# Patient Record
Sex: MALE | Race: WHITE | NOT HISPANIC OR LATINO | Employment: OTHER | URBAN - METROPOLITAN AREA
[De-identification: names, ages, dates, MRNs, and addresses within clinical notes are randomized per-mention and may not be internally consistent; named-entity substitution may affect disease eponyms.]

---

## 2023-07-03 ENCOUNTER — TELEPHONE (OUTPATIENT)
Dept: TRANSPLANT | Facility: CLINIC | Age: 68
End: 2023-07-03

## 2023-07-03 DIAGNOSIS — I50.9 CONGESTIVE HEART FAILURE, UNSPECIFIED HF CHRONICITY, UNSPECIFIED HEART FAILURE TYPE: Primary | ICD-10-CM

## 2023-08-17 ENCOUNTER — TELEPHONE (OUTPATIENT)
Dept: INTERNAL MEDICINE | Facility: CLINIC | Age: 68
End: 2023-08-17
Payer: COMMERCIAL

## 2023-08-17 NOTE — TELEPHONE ENCOUNTER
----- Message from Sanju Bowser sent at 8/17/2023  2:15 PM CDT -----  Name of Who is Calling: RIO DIALLO [[id number]]           What is the request in detail: Patient is requesting a call back to schedule a new patient appointment to establish care.  Please assist.           Can the clinic reply by [facility]: No           What Number to Call Back if not in [facility]: [phone]

## 2023-08-29 ENCOUNTER — OFFICE VISIT (OUTPATIENT)
Dept: CARDIOLOGY | Facility: CLINIC | Age: 68
End: 2023-08-29
Payer: MEDICARE

## 2023-08-29 VITALS
OXYGEN SATURATION: 98 % | HEART RATE: 77 BPM | DIASTOLIC BLOOD PRESSURE: 96 MMHG | BODY MASS INDEX: 31.5 KG/M2 | WEIGHT: 196 LBS | SYSTOLIC BLOOD PRESSURE: 154 MMHG | HEIGHT: 66 IN

## 2023-08-29 DIAGNOSIS — Z79.4 DIABETES MELLITUS DUE TO UNDERLYING CONDITION WITH HYPEROSMOLARITY WITHOUT COMA, WITH LONG-TERM CURRENT USE OF INSULIN: ICD-10-CM

## 2023-08-29 DIAGNOSIS — Z95.810 ICD (IMPLANTABLE CARDIOVERTER-DEFIBRILLATOR) IN PLACE: ICD-10-CM

## 2023-08-29 DIAGNOSIS — I42.8 NONISCHEMIC CARDIOMYOPATHY: Primary | ICD-10-CM

## 2023-08-29 DIAGNOSIS — I10 PRIMARY HYPERTENSION: ICD-10-CM

## 2023-08-29 DIAGNOSIS — E08.00 DIABETES MELLITUS DUE TO UNDERLYING CONDITION WITH HYPEROSMOLARITY WITHOUT COMA, WITH LONG-TERM CURRENT USE OF INSULIN: ICD-10-CM

## 2023-08-29 DIAGNOSIS — Z95.810 AICD (AUTOMATIC CARDIOVERTER/DEFIBRILLATOR) PRESENT: ICD-10-CM

## 2023-08-29 DIAGNOSIS — I49.5 SSS (SICK SINUS SYNDROME): ICD-10-CM

## 2023-08-29 DIAGNOSIS — N05.9 GLOMERULONEPHRITIS: ICD-10-CM

## 2023-08-29 PROCEDURE — 99213 OFFICE O/P EST LOW 20 MIN: CPT | Mod: PBBFAC | Performed by: INTERNAL MEDICINE

## 2023-08-29 PROCEDURE — 99999 PR PBB SHADOW E&M-EST. PATIENT-LVL III: CPT | Mod: PBBFAC,,, | Performed by: INTERNAL MEDICINE

## 2023-08-29 PROCEDURE — 99204 OFFICE O/P NEW MOD 45 MIN: CPT | Mod: S$PBB,,, | Performed by: INTERNAL MEDICINE

## 2023-08-29 PROCEDURE — 99999 PR PBB SHADOW E&M-EST. PATIENT-LVL III: ICD-10-PCS | Mod: PBBFAC,,, | Performed by: INTERNAL MEDICINE

## 2023-08-29 PROCEDURE — 99204 PR OFFICE/OUTPT VISIT, NEW, LEVL IV, 45-59 MIN: ICD-10-PCS | Mod: S$PBB,,, | Performed by: INTERNAL MEDICINE

## 2023-08-29 NOTE — PROGRESS NOTES
"Subjective:    Patient ID:  Ramesh Acosta is a 68 y.o. male who presents for management of non ischemic cardiomyopathy.    HPI  The patient is a 68 year old male presents to establish care at [facility]. He is a [place] native. He has been receiving his health care at the [facility] under the care of Dr Rob[ Advanced Heart failure-Transplant]. He has non ischemic cardiomyopathy with an initial EF of 20% 10/26/20 that has improved to 40% 6/14/23 with medical management. There is a history of alcohol use. He has diabetes, hypertension and hyperlipidemia. He has sick sinus syndrome and had a pacemaker placed but later replaced by a ICD. He has a form of glomerulonephritis and now has normal renal function. He has OPAL but not on CPAP. Currently he is asymptomatic,  is retired and active.      No results found for: "NA", "K", "CL", "CO2", "BUN", "CREATININE", "GLU", "HGBA1C", "MG", "AST", "ALT", "ALBUMIN", "PROT", "BILITOT", "WBC", "HGB", "HCT", "MCV", "PLT", "INR", "PSA", "TSH"      No results found for: "CHOL", "HDL", "LDL", "TRIG"    No results found for: "LDLCALC"    History reviewed. No pertinent past medical history.  No current outpatient medications on file.          Review of Systems   Constitutional: Negative for decreased appetite, diaphoresis, fever, malaise/fatigue, weight gain and weight loss.   HENT:  Negative for congestion, ear discharge, ear pain and nosebleeds.    Eyes:  Negative for blurred vision, double vision and visual disturbance.   Cardiovascular:  Negative for chest pain, claudication, cyanosis, dyspnea on exertion, irregular heartbeat, leg swelling, near-syncope, orthopnea, palpitations, paroxysmal nocturnal dyspnea and syncope.   Respiratory:  Positive for snoring. Negative for cough, hemoptysis, shortness of breath, sleep disturbances due to breathing, sputum production and wheezing.    Endocrine: Negative for polydipsia, polyphagia and polyuria. " "  Hematologic/Lymphatic: Negative for adenopathy and bleeding problem. Does not bruise/bleed easily.   Skin:  Negative for color change, nail changes, poor wound healing and rash.   Musculoskeletal:  Negative for muscle cramps and muscle weakness.   Gastrointestinal:  Negative for abdominal pain, anorexia, change in bowel habit, hematochezia, nausea and vomiting.   Genitourinary:  Negative for dysuria, frequency and hematuria.   Neurological:  Negative for brief paralysis, difficulty with concentration, excessive daytime sleepiness, dizziness, focal weakness, headaches, light-headedness, seizures, vertigo and weakness.   Psychiatric/Behavioral:  Negative for altered mental status and depression.    Allergic/Immunologic: Negative for persistent infections.        Objective:BP (!) 154/96   Pulse 77   Ht 5' 6" (1.676 m)   Wt 88.9 kg (195 lb 15.8 oz)   SpO2 98%   BMI 31.63 kg/m²             Physical Exam  Constitutional:       Appearance: He is well-developed. He is obese.   HENT:      Head: Normocephalic.      Right Ear: External ear normal.      Left Ear: External ear normal.      Nose: Nose normal.   Eyes:      General: No scleral icterus.     Pupils: Pupils are equal, round, and reactive to light.   Neck:      Thyroid: No thyromegaly.      Vascular: No JVD.      Trachea: No tracheal deviation.   Cardiovascular:      Rate and Rhythm: Normal rate and regular rhythm.      Pulses: Intact distal pulses.           Carotid pulses are 2+ on the right side and 2+ on the left side.       Dorsalis pedis pulses are 2+ on the right side and 2+ on the left side.        Posterior tibial pulses are 2+ on the right side and 2+ on the left side.      Heart sounds: No murmur heard.     No friction rub. No gallop.   Pulmonary:      Effort: Pulmonary effort is normal.      Breath sounds: Normal breath sounds.   Abdominal:      General: Bowel sounds are normal. There is no distension.      Tenderness: There is no abdominal " tenderness. There is no guarding.   Musculoskeletal:         General: No tenderness. Normal range of motion.      Cervical back: Normal range of motion and neck supple.   Lymphadenopathy:      Comments: Palpation of neck and groin lymph nodes normal   Skin:     General: Skin is dry.      Comments: Palpation of skin normal   Neurological:      Mental Status: He is alert and oriented to person, place, and time.      Cranial Nerves: No cranial nerve deficit.      Motor: No abnormal muscle tone.      Coordination: Coordination normal.   Psychiatric:         Behavior: Behavior normal.         Thought Content: Thought content normal.         Judgment: Judgment normal.         Assessment:       1. Nonischemic cardiomyopathy    2. SSS (sick sinus syndrome)    3. AICD (automatic cardioverter/defibrillator) present    4. Primary hypertension    5. Glomerulonephritis    6. Diabetes mellitus due to underlying condition with hyperosmolarity without coma, with long-term current use of insulin    7. ICD (implantable cardioverter-defibrillator) in place         Plan:       Ramesh was seen today for family h/x cad.    Diagnoses and all orders for this visit:    Nonischemic cardiomyopathy  -     CBC Auto Differential; Future; Expected date: 08/29/2023  -     Comprehensive Metabolic Panel; Future; Expected date: 08/29/2023  -     SCHEDULED EKG 12-LEAD (to Muse); Future  -     Echo Saline Bubble? No; Future    SSS (sick sinus syndrome)    AICD (automatic cardioverter/defibrillator) present    Primary hypertension    Glomerulonephritis    Diabetes mellitus due to underlying condition with hyperosmolarity without coma, with long-term current use of insulin  -     Lipid Panel; Future; Expected date: 08/29/2023  -     Hemoglobin A1C; Future; Expected date: 11/27/2023    ICD (implantable cardioverter-defibrillator) in place  -     Ambulatory referral/consult to Electrophysiology; Future; Expected date: 09/05/2023

## 2023-08-30 ENCOUNTER — PATIENT MESSAGE (OUTPATIENT)
Dept: CARDIOLOGY | Facility: CLINIC | Age: 68
End: 2023-08-30
Payer: COMMERCIAL

## 2023-09-06 DIAGNOSIS — I48.0 PAROXYSMAL ATRIAL FIBRILLATION: Primary | ICD-10-CM

## 2023-09-12 ENCOUNTER — PATIENT OUTREACH (OUTPATIENT)
Dept: ADMINISTRATIVE | Facility: HOSPITAL | Age: 68
End: 2023-09-12
Payer: COMMERCIAL

## 2023-09-12 ENCOUNTER — OFFICE VISIT (OUTPATIENT)
Dept: ELECTROPHYSIOLOGY | Facility: CLINIC | Age: 68
End: 2023-09-12
Payer: MEDICARE

## 2023-09-12 ENCOUNTER — CLINICAL SUPPORT (OUTPATIENT)
Dept: CARDIOLOGY | Facility: HOSPITAL | Age: 68
End: 2023-09-12
Attending: INTERNAL MEDICINE
Payer: MEDICARE

## 2023-09-12 VITALS
BODY MASS INDEX: 31.96 KG/M2 | DIASTOLIC BLOOD PRESSURE: 76 MMHG | SYSTOLIC BLOOD PRESSURE: 124 MMHG | WEIGHT: 198.88 LBS | HEART RATE: 81 BPM | HEIGHT: 66 IN

## 2023-09-12 DIAGNOSIS — E11.9 TYPE 2 DIABETES MELLITUS WITHOUT COMPLICATION, WITH LONG-TERM CURRENT USE OF INSULIN: ICD-10-CM

## 2023-09-12 DIAGNOSIS — I48.0 PAROXYSMAL ATRIAL FIBRILLATION: ICD-10-CM

## 2023-09-12 DIAGNOSIS — I10 PRIMARY HYPERTENSION: ICD-10-CM

## 2023-09-12 DIAGNOSIS — Z79.4 TYPE 2 DIABETES MELLITUS WITHOUT COMPLICATION, WITH LONG-TERM CURRENT USE OF INSULIN: ICD-10-CM

## 2023-09-12 DIAGNOSIS — Z95.810 ICD (IMPLANTABLE CARDIOVERTER-DEFIBRILLATOR) IN PLACE: ICD-10-CM

## 2023-09-12 DIAGNOSIS — I42.8 NONISCHEMIC CARDIOMYOPATHY: ICD-10-CM

## 2023-09-12 PROBLEM — N18.31 TYPE 2 DIABETES MELLITUS WITH STAGE 3A CHRONIC KIDNEY DISEASE, WITH LONG-TERM CURRENT USE OF INSULIN: Status: ACTIVE | Noted: 2023-09-12

## 2023-09-12 PROBLEM — E11.22 TYPE 2 DIABETES MELLITUS WITH STAGE 3A CHRONIC KIDNEY DISEASE, WITH LONG-TERM CURRENT USE OF INSULIN: Status: ACTIVE | Noted: 2023-09-12

## 2023-09-12 PROCEDURE — 93010 ELECTROCARDIOGRAM REPORT: CPT | Mod: S$PBB,,, | Performed by: INTERNAL MEDICINE

## 2023-09-12 PROCEDURE — 93010 EKG 12-LEAD: ICD-10-PCS | Mod: S$PBB,,, | Performed by: INTERNAL MEDICINE

## 2023-09-12 PROCEDURE — 99213 OFFICE O/P EST LOW 20 MIN: CPT | Mod: PBBFAC | Performed by: INTERNAL MEDICINE

## 2023-09-12 PROCEDURE — 99204 OFFICE O/P NEW MOD 45 MIN: CPT | Mod: S$PBB,,, | Performed by: INTERNAL MEDICINE

## 2023-09-12 PROCEDURE — 93283 PRGRMG EVAL IMPLANTABLE DFB: CPT | Mod: 26,,, | Performed by: INTERNAL MEDICINE

## 2023-09-12 PROCEDURE — 99999 PR PBB SHADOW E&M-EST. PATIENT-LVL III: ICD-10-PCS | Mod: PBBFAC,,, | Performed by: INTERNAL MEDICINE

## 2023-09-12 PROCEDURE — 99999 PR PBB SHADOW E&M-EST. PATIENT-LVL III: CPT | Mod: PBBFAC,,, | Performed by: INTERNAL MEDICINE

## 2023-09-12 PROCEDURE — 99204 PR OFFICE/OUTPT VISIT, NEW, LEVL IV, 45-59 MIN: ICD-10-PCS | Mod: S$PBB,,, | Performed by: INTERNAL MEDICINE

## 2023-09-12 PROCEDURE — 93283 CARDIAC DEVICE CHECK - IN CLINIC & HOSPITAL: ICD-10-PCS | Mod: 26,,, | Performed by: INTERNAL MEDICINE

## 2023-09-12 PROCEDURE — 93005 ELECTROCARDIOGRAM TRACING: CPT | Mod: PBBFAC | Performed by: INTERNAL MEDICINE

## 2023-09-12 PROCEDURE — 93283 PRGRMG EVAL IMPLANTABLE DFB: CPT

## 2023-09-12 RX ORDER — FUROSEMIDE 20 MG/1
20 TABLET ORAL DAILY
COMMUNITY
End: 2023-10-12 | Stop reason: SDUPTHER

## 2023-09-12 RX ORDER — FLUTICASONE PROPIONATE 50 MCG
1 SPRAY, SUSPENSION (ML) NASAL DAILY
COMMUNITY

## 2023-09-12 RX ORDER — LEVOCETIRIZINE DIHYDROCHLORIDE 5 MG/1
5 TABLET, FILM COATED ORAL NIGHTLY
COMMUNITY

## 2023-09-12 RX ORDER — ROSUVASTATIN CALCIUM 5 MG/1
5 TABLET, COATED ORAL DAILY
COMMUNITY
End: 2023-10-12 | Stop reason: SDUPTHER

## 2023-09-12 RX ORDER — HYDROXYZINE HYDROCHLORIDE 25 MG/1
25 TABLET, FILM COATED ORAL NIGHTLY
COMMUNITY
End: 2023-10-12 | Stop reason: SDUPTHER

## 2023-09-12 RX ORDER — CARVEDILOL 12.5 MG/1
25 TABLET ORAL 2 TIMES DAILY WITH MEALS
COMMUNITY
End: 2023-10-10 | Stop reason: SDUPTHER

## 2023-09-12 RX ORDER — INSULIN GLARGINE 100 [IU]/ML
INJECTION, SOLUTION SUBCUTANEOUS NIGHTLY
COMMUNITY
End: 2023-09-26 | Stop reason: SDUPTHER

## 2023-09-12 RX ORDER — SPIRONOLACTONE 25 MG/1
12.5 TABLET ORAL DAILY
COMMUNITY
End: 2023-11-14 | Stop reason: SDUPTHER

## 2023-09-12 NOTE — PROGRESS NOTES
Subjective:   Patient ID:  Ramesh Acosta is a 68 y.o. male who presents for evaluation of ICD check    Referring Cardiologist: Ajay Azul MD    HPI  I had the pleasure of seeing Mr. Acosta today in our electrophysiology clinic in consultation for his ICD. As you are aware he is a pleasant 68 year-old man with sick sinus syndrome s/p PPM, nonischemic CMP with PPM upgraded to ICD in 2021, hypertension, and diabetes mellitus. His PPM was originally implanted in 1984 for syncope. He reports he had prior lead revisions due to a lead fracture and then had an episode of a lead erosion after generator change resulting in system extraction. He was diagnosed with heart failure in 10/2020 with a LVEF of 20%. His device was upgraded to an ICD (right sided). Recent ECHO in June of 2023 noted a LVEF of 40%. He recently moved back to [place] and is here to establish care for his ICD. He feels well and is without complaints.    In-clinic ICD check notes stable lead parameters with 3.6% RA and no RV pacing. No arrhythmias. Estimated battery longevity is 10 years.    My interpretation of today's in-clinic ECG is normal sinus rhythm with a narrow QRS and normal intervals.    Review of Systems   Constitutional: Negative for fever and malaise/fatigue.   HENT:  Negative for congestion and sore throat.    Eyes:  Negative for blurred vision and visual disturbance.   Cardiovascular:  Negative for chest pain, dyspnea on exertion, irregular heartbeat, near-syncope, palpitations and syncope.   Respiratory:  Negative for cough and shortness of breath.    Hematologic/Lymphatic: Negative for bleeding problem. Does not bruise/bleed easily.   Skin: Negative.    Musculoskeletal: Negative.    Gastrointestinal:  Negative for bloating, abdominal pain, hematochezia and melena.   Neurological:  Negative for focal weakness and weakness.   Psychiatric/Behavioral: Negative.         Objective:   Physical Exam  Vitals reviewed.   Constitutional:        General: He is not in acute distress.     Appearance: He is well-developed. He is not diaphoretic.   HENT:      Head: Normocephalic and atraumatic.   Eyes:      General:         Right eye: No discharge.         Left eye: No discharge.      Conjunctiva/sclera: Conjunctivae normal.   Cardiovascular:      Rate and Rhythm: Normal rate and regular rhythm.      Heart sounds: No murmur heard.     No friction rub. No gallop.   Pulmonary:      Effort: Pulmonary effort is normal. No respiratory distress.      Breath sounds: Normal breath sounds. No wheezing or rales.   Abdominal:      General: Bowel sounds are normal. There is no distension.      Palpations: Abdomen is soft.      Tenderness: There is no abdominal tenderness.   Musculoskeletal:      Cervical back: Neck supple.   Skin:     General: Skin is warm and dry.   Neurological:      Mental Status: He is alert and oriented to person, place, and time.   Psychiatric:         Behavior: Behavior normal.         Thought Content: Thought content normal.         Judgment: Judgment normal.         Assessment:      1. ICD (implantable cardioverter-defibrillator) in place    2. Nonischemic cardiomyopathy    3. Primary hypertension    4. Type 2 diabetes mellitus without complication, with long-term current use of insulin        Plan:   In summary, Mr. Acosta is a pleasant 68 year-old man with sick sinus syndrome s/p PPM, nonischemic CMP with PPM upgraded to ICD, hypertension, and diabetes mellitus. Previous PPM was left sided but he had a pocket erosion followed by extraction. ICD is right sided and operating normally. Will enroll in remote monitoring clinic. RTC in 1 year.    Thank you for allowing me to participate in the care of this patient. Please do not hesitate to call me with any questions or concerns.    Bright Smith MD, PhD  Cardiac Electrophysiology

## 2023-09-19 ENCOUNTER — LAB VISIT (OUTPATIENT)
Dept: LAB | Facility: HOSPITAL | Age: 68
End: 2023-09-19
Attending: INTERNAL MEDICINE
Payer: MEDICARE

## 2023-09-19 DIAGNOSIS — Z79.4 DIABETES MELLITUS DUE TO UNDERLYING CONDITION WITH HYPEROSMOLARITY WITHOUT COMA, WITH LONG-TERM CURRENT USE OF INSULIN: ICD-10-CM

## 2023-09-19 DIAGNOSIS — E08.00 DIABETES MELLITUS DUE TO UNDERLYING CONDITION WITH HYPEROSMOLARITY WITHOUT COMA, WITH LONG-TERM CURRENT USE OF INSULIN: ICD-10-CM

## 2023-09-19 DIAGNOSIS — I42.8 NONISCHEMIC CARDIOMYOPATHY: ICD-10-CM

## 2023-09-19 LAB
ALBUMIN SERPL BCP-MCNC: 3.5 G/DL (ref 3.5–5.2)
ALP SERPL-CCNC: 65 U/L (ref 55–135)
ALT SERPL W/O P-5'-P-CCNC: 15 U/L (ref 10–44)
ANION GAP SERPL CALC-SCNC: 7 MMOL/L (ref 8–16)
AST SERPL-CCNC: 14 U/L (ref 10–40)
BASOPHILS # BLD AUTO: 0.03 K/UL (ref 0–0.2)
BASOPHILS NFR BLD: 0.5 % (ref 0–1.9)
BILIRUB SERPL-MCNC: 1.5 MG/DL (ref 0.1–1)
BUN SERPL-MCNC: 17 MG/DL (ref 8–23)
CALCIUM SERPL-MCNC: 8.7 MG/DL (ref 8.7–10.5)
CHLORIDE SERPL-SCNC: 108 MMOL/L (ref 95–110)
CHOLEST SERPL-MCNC: 126 MG/DL (ref 120–199)
CHOLEST/HDLC SERPL: 2.5 {RATIO} (ref 2–5)
CO2 SERPL-SCNC: 29 MMOL/L (ref 23–29)
CREAT SERPL-MCNC: 1.1 MG/DL (ref 0.5–1.4)
DIFFERENTIAL METHOD: NORMAL
EOSINOPHIL # BLD AUTO: 0.1 K/UL (ref 0–0.5)
EOSINOPHIL NFR BLD: 0.9 % (ref 0–8)
ERYTHROCYTE [DISTWIDTH] IN BLOOD BY AUTOMATED COUNT: 14.3 % (ref 11.5–14.5)
EST. GFR  (NO RACE VARIABLE): >60 ML/MIN/1.73 M^2
ESTIMATED AVG GLUCOSE: 131 MG/DL (ref 68–131)
GLUCOSE SERPL-MCNC: 101 MG/DL (ref 70–110)
HBA1C MFR BLD: 6.2 % (ref 4–5.6)
HCT VFR BLD AUTO: 43.7 % (ref 40–54)
HDLC SERPL-MCNC: 50 MG/DL (ref 40–75)
HDLC SERPL: 39.7 % (ref 20–50)
HGB BLD-MCNC: 14.8 G/DL (ref 14–18)
IMM GRANULOCYTES # BLD AUTO: 0.01 K/UL (ref 0–0.04)
IMM GRANULOCYTES NFR BLD AUTO: 0.2 % (ref 0–0.5)
LDLC SERPL CALC-MCNC: 62 MG/DL (ref 63–159)
LYMPHOCYTES # BLD AUTO: 1.8 K/UL (ref 1–4.8)
LYMPHOCYTES NFR BLD: 32.7 % (ref 18–48)
MCH RBC QN AUTO: 30.6 PG (ref 27–31)
MCHC RBC AUTO-ENTMCNC: 33.9 G/DL (ref 32–36)
MCV RBC AUTO: 90 FL (ref 82–98)
MONOCYTES # BLD AUTO: 0.5 K/UL (ref 0.3–1)
MONOCYTES NFR BLD: 8.3 % (ref 4–15)
NEUTROPHILS # BLD AUTO: 3.2 K/UL (ref 1.8–7.7)
NEUTROPHILS NFR BLD: 57.4 % (ref 38–73)
NONHDLC SERPL-MCNC: 76 MG/DL
NRBC BLD-RTO: 0 /100 WBC
PLATELET # BLD AUTO: 167 K/UL (ref 150–450)
PMV BLD AUTO: 10.2 FL (ref 9.2–12.9)
POTASSIUM SERPL-SCNC: 3.5 MMOL/L (ref 3.5–5.1)
PROT SERPL-MCNC: 6.4 G/DL (ref 6–8.4)
RBC # BLD AUTO: 4.84 M/UL (ref 4.6–6.2)
SODIUM SERPL-SCNC: 144 MMOL/L (ref 136–145)
TRIGL SERPL-MCNC: 70 MG/DL (ref 30–150)
WBC # BLD AUTO: 5.54 K/UL (ref 3.9–12.7)

## 2023-09-19 PROCEDURE — 80053 COMPREHEN METABOLIC PANEL: CPT | Performed by: INTERNAL MEDICINE

## 2023-09-19 PROCEDURE — 85025 COMPLETE CBC W/AUTO DIFF WBC: CPT | Performed by: INTERNAL MEDICINE

## 2023-09-19 PROCEDURE — 80061 LIPID PANEL: CPT | Performed by: INTERNAL MEDICINE

## 2023-09-19 PROCEDURE — 36415 COLL VENOUS BLD VENIPUNCTURE: CPT | Mod: PN | Performed by: INTERNAL MEDICINE

## 2023-09-19 PROCEDURE — 83036 HEMOGLOBIN GLYCOSYLATED A1C: CPT | Performed by: INTERNAL MEDICINE

## 2023-09-26 ENCOUNTER — LAB VISIT (OUTPATIENT)
Dept: LAB | Facility: OTHER | Age: 68
End: 2023-09-26
Attending: FAMILY MEDICINE
Payer: MEDICARE

## 2023-09-26 ENCOUNTER — OFFICE VISIT (OUTPATIENT)
Dept: INTERNAL MEDICINE | Facility: CLINIC | Age: 68
End: 2023-09-26
Payer: MEDICARE

## 2023-09-26 VITALS
HEIGHT: 66 IN | SYSTOLIC BLOOD PRESSURE: 142 MMHG | WEIGHT: 206.38 LBS | OXYGEN SATURATION: 96 % | DIASTOLIC BLOOD PRESSURE: 88 MMHG | BODY MASS INDEX: 33.17 KG/M2 | HEART RATE: 74 BPM

## 2023-09-26 DIAGNOSIS — Z12.5 PROSTATE CANCER SCREENING: ICD-10-CM

## 2023-09-26 DIAGNOSIS — I10 PRIMARY HYPERTENSION: ICD-10-CM

## 2023-09-26 DIAGNOSIS — Z79.4 TYPE 2 DIABETES MELLITUS WITHOUT COMPLICATION, WITH LONG-TERM CURRENT USE OF INSULIN: ICD-10-CM

## 2023-09-26 DIAGNOSIS — R97.20 ELEVATED PSA, LESS THAN 10 NG/ML: ICD-10-CM

## 2023-09-26 DIAGNOSIS — Z95.810 ICD (IMPLANTABLE CARDIOVERTER-DEFIBRILLATOR) IN PLACE: ICD-10-CM

## 2023-09-26 DIAGNOSIS — E11.9 TYPE 2 DIABETES MELLITUS WITHOUT COMPLICATION, WITH LONG-TERM CURRENT USE OF INSULIN: ICD-10-CM

## 2023-09-26 DIAGNOSIS — R97.20 ELEVATED PSA, LESS THAN 10 NG/ML: Primary | ICD-10-CM

## 2023-09-26 DIAGNOSIS — Z87.448 HISTORY OF GLOMERULONEPHRITIS: ICD-10-CM

## 2023-09-26 DIAGNOSIS — R97.20 PSA ELEVATION: ICD-10-CM

## 2023-09-26 DIAGNOSIS — I42.8 NONISCHEMIC CARDIOMYOPATHY: ICD-10-CM

## 2023-09-26 PROCEDURE — 99999 PR PBB SHADOW E&M-EST. PATIENT-LVL III: CPT | Mod: PBBFAC,,, | Performed by: FAMILY MEDICINE

## 2023-09-26 PROCEDURE — 36415 COLL VENOUS BLD VENIPUNCTURE: CPT | Performed by: FAMILY MEDICINE

## 2023-09-26 PROCEDURE — 84153 ASSAY OF PSA TOTAL: CPT | Performed by: FAMILY MEDICINE

## 2023-09-26 PROCEDURE — 99999 PR PBB SHADOW E&M-EST. PATIENT-LVL III: ICD-10-PCS | Mod: PBBFAC,,, | Performed by: FAMILY MEDICINE

## 2023-09-26 PROCEDURE — 99204 OFFICE O/P NEW MOD 45 MIN: CPT | Mod: S$PBB,,, | Performed by: FAMILY MEDICINE

## 2023-09-26 PROCEDURE — 99204 PR OFFICE/OUTPT VISIT, NEW, LEVL IV, 45-59 MIN: ICD-10-PCS | Mod: S$PBB,,, | Performed by: FAMILY MEDICINE

## 2023-09-26 PROCEDURE — 99213 OFFICE O/P EST LOW 20 MIN: CPT | Mod: PBBFAC | Performed by: FAMILY MEDICINE

## 2023-09-26 RX ORDER — INSULIN GLARGINE 100 [IU]/ML
10 INJECTION, SOLUTION SUBCUTANEOUS NIGHTLY
Qty: 10 ML | Refills: 3 | Status: SHIPPED | OUTPATIENT
Start: 2023-09-26 | End: 2023-10-12 | Stop reason: SDUPTHER

## 2023-09-26 NOTE — PROGRESS NOTES
Subjective:      Chief Complaint: No chief complaint on file.    HPI  Ramesh Acosta is a 68 y.o. male. He presents to establish care at [facility]. He is a [place] native, but has been receiving health care at the [facility]. He has a PMHx of diabetes, hypertension, hyperlipidemia, non ischemic cardiomyopathy, sick sinus syndrome, OPAL but not on CPAP, glomerulonephritis with normal renal function.  Currently he is asymptomatic,  is retired and active.    Cardiac hx  History of non ischemic cardiomyopathy (suspected from prior alcohol intake) with an initial EF of 20% 10/26/20 that has improved to 40% 6/14/23 with medical management. Currently managed with Entresto, carvedilol, furosemide, spironolactone. Follows with Dr. Azul ([facility]). Next visit with cardiology in 6 months.     H/o ICD  Sick sinus syndrome  History of sick sinus syndrome and had a pacemaker placed 30 years ago, but later replaced by a ICD. Had follow up with Dr. Smith on 9/12/23. In-clinic ICD check notes stable lead parameters with 3.6% RA and no RV pacing. No arrhythmias. Estimated battery longevity is 10 years. He will follow up yearly with electrophysiology.     HTN  Checks BP at home semi-regularly. Used to keep a log but has not been as diligent lately. BP in clinic today is 142/88.  When he checks at home it is usually 130's/80's. Occasionally forgets to take carvedilol due to needing to take it with food.     Diabetes  Last A1c was 6.2 on 9/19/23. Currently taking insulin for sugar control. On 10 units of long acting at night, does not take any short acting insulin. At home, sugars averaging 100 in the morning before eating. Tried metformin in the past but made him nauseated and gave him diarrhea.     Glomerulonephritis  Diagnosed with minimal change disease in 2019. Treated with steroids for 4 months at that time. In full remission and has normal renal function now. Last Cr was 1.1 on 9/12/23. Will follow  with nephrologist at [facility].     Hyperlipidemia  Last lipid panel was WNL on 9/19/23. Taking rosuvastatin daily with no concerns or complaints.     Gout  Had an episode of gout a year and a half ago and noticed beer triggered him. Not drinking beer anymore. Had colchicine at that time. Usually gets episodes in his toes and feet.     Current medications: up to date and no concerns or complaints. Does not need refills at this time.     Health maintenance   Colorectal screening - ColoGuard on 1/27/23 (q 3 years)  PSA - 4.62 on 9/2020  Annual labs   Vaccines  - due for annual flu  - due for COVID booster  - received first dose of Shingrix at [facility] earlier this year  - RSV - eligible  - pneumococcal 23 - 2020, due for 13    ROS  Review of Systems   Constitutional:  Negative for appetite change, chills, fatigue, fever and unexpected weight change.   HENT:  Negative for congestion, postnasal drip, rhinorrhea and sore throat.    Respiratory:  Negative for cough and shortness of breath.    Cardiovascular:  Positive for leg swelling. Negative for chest pain and palpitations.   Gastrointestinal:  Negative for abdominal pain, constipation, diarrhea, nausea and vomiting.   Genitourinary:  Negative for difficulty urinating, dysuria, frequency, hematuria and urgency.   Musculoskeletal:  Negative for arthralgias, back pain and neck pain.   Allergic/Immunologic: Positive for environmental allergies.   Neurological:  Negative for dizziness, syncope, weakness, light-headedness, numbness and headaches.   Psychiatric/Behavioral:  Negative for confusion, dysphoric mood and sleep disturbance. The patient is not nervous/anxious.           Objective:      Physical Exam   Physical Exam  Vitals and nursing note reviewed.   Constitutional:       Appearance: Normal appearance. He is normal weight.   HENT:      Head: Normocephalic and atraumatic.      Nose: Nose normal.      Mouth/Throat:      Mouth: Mucous membranes are moist.      Pharynx:  Oropharynx is clear.   Eyes:      Extraocular Movements: Extraocular movements intact.      Conjunctiva/sclera: Conjunctivae normal.   Cardiovascular:      Rate and Rhythm: Normal rate and regular rhythm.      Pulses: Normal pulses.      Heart sounds: Normal heart sounds.   Pulmonary:      Effort: Pulmonary effort is normal.      Breath sounds: Normal breath sounds.   Abdominal:      General: Abdomen is flat. Bowel sounds are normal.      Palpations: Abdomen is soft.   Musculoskeletal:         General: No swelling. Normal range of motion.      Cervical back: Normal range of motion and neck supple.      Right lower le+ Pitting Edema present.      Left lower le+ Pitting Edema present.   Skin:     General: Skin is warm and dry.      Capillary Refill: Capillary refill takes less than 2 seconds.   Neurological:      General: No focal deficit present.      Mental Status: He is alert and oriented to person, place, and time.   Psychiatric:         Mood and Affect: Mood normal.         Behavior: Behavior normal.         Thought Content: Thought content normal.          Assessment:       1. Elevated PSA, less than 10 ng/ml  PSA, SCREENING      2. Type 2 diabetes mellitus without complication, with long-term current use of insulin  insulin glargine (LANTUS) 100 unit/mL injection    RSV vac, preF A and preF B,PF, 120 mcg/0.5 mL SolR      3. Nonischemic cardiomyopathy        4. ICD (implantable cardioverter-defibrillator) in place        5. Primary hypertension        6. History of glomerulonephritis  Ambulatory referral/consult to Nephrology      7. PSA elevation        8. Prostate cancer screening  PSA, SCREENING            Plan:       Elevated PSA, less than 10 ng/ml  Prostate cancer screening  Elevated to 4.62 in 2020. Will re-check today and discuss results with patient via My Chart.   - PSA, SCREENING; Future    Type 2 diabetes mellitus without complication, with long-term current use of insulin  Chronic, stable.  Last HbA1c elevated to 6.2. Counseled on dietary changes. Patient will continue checking daily sugars. Refilled insulin for one year.   - insulin glargine (LANTUS) 100 unit/mL injection; Inject 10 Units into the skin every evening.  Dispense: 10 mL; Refill: 3  - RSV vac, preF A and preF B,PF, 120 mcg/0.5 mL SolR; Inject 0.5 mLs (120 mcg total) into the muscle once. for 1 dose  Dispense: 0.5 mL; Refill: 0    Nonischemic cardiomyopathy  Chronic, stable. Managed by Dr. Azul and will follow up in 6 months for routine care.     ICD (implantable cardioverter-defibrillator) in place  Follows annually with electrophysiology and had appointment earlier this month. No complaints.     Primary hypertension  Slightly elevated in clinic today. Patient will start checking BP at home with cuff and keep daily log.     History of glomerulonephritis  In remission. Referral placed to nephrology so he can follow up with them to establish care.   - Ambulatory referral/consult to Nephrology; Future       Health Maintenance  - updated annual flu today  - PSA ordered   - information about RSV and COVID booster vaccines given  - refilled insulin   - referral to nephrology placed  - RTC for routine annual follow-up or sooner as needed

## 2023-09-27 LAB — COMPLEXED PSA SERPL-MCNC: 1.8 NG/ML (ref 0–4)

## 2023-09-29 ENCOUNTER — HOSPITAL ENCOUNTER (EMERGENCY)
Facility: HOSPITAL | Age: 68
Discharge: HOME | End: 2023-09-29
Attending: EMERGENCY MEDICINE
Payer: MEDICARE

## 2023-09-29 VITALS
DIASTOLIC BLOOD PRESSURE: 80 MMHG | HEART RATE: 69 BPM | BODY MASS INDEX: 33.17 KG/M2 | SYSTOLIC BLOOD PRESSURE: 116 MMHG | OXYGEN SATURATION: 97 % | RESPIRATION RATE: 18 BRPM | HEIGHT: 66 IN | WEIGHT: 206.38 LBS | TEMPERATURE: 98 F

## 2023-09-29 DIAGNOSIS — E87.6 HYPOKALEMIA: ICD-10-CM

## 2023-09-29 DIAGNOSIS — R55 SYNCOPE: Primary | ICD-10-CM

## 2023-09-29 LAB
ALBUMIN SERPL BCP-MCNC: 3.7 G/DL (ref 3.5–5.2)
ALP SERPL-CCNC: 77 U/L (ref 55–135)
ALT SERPL W/O P-5'-P-CCNC: 16 U/L (ref 10–44)
ANION GAP SERPL CALC-SCNC: 10 MMOL/L (ref 8–16)
AST SERPL-CCNC: 25 U/L (ref 10–40)
BASOPHILS # BLD AUTO: 0.07 K/UL (ref 0–0.2)
BASOPHILS NFR BLD: 0.9 % (ref 0–1.9)
BILIRUB SERPL-MCNC: 1.5 MG/DL (ref 0.1–1)
BNP SERPL-MCNC: 108 PG/ML (ref 0–99)
BUN SERPL-MCNC: 17 MG/DL (ref 6–30)
BUN SERPL-MCNC: 17 MG/DL (ref 8–23)
CALCIUM SERPL-MCNC: 9.2 MG/DL (ref 8.7–10.5)
CHLORIDE SERPL-SCNC: 102 MMOL/L (ref 95–110)
CHLORIDE SERPL-SCNC: 103 MMOL/L (ref 95–110)
CO2 SERPL-SCNC: 28 MMOL/L (ref 23–29)
CREAT SERPL-MCNC: 1.1 MG/DL (ref 0.5–1.4)
CREAT SERPL-MCNC: 1.1 MG/DL (ref 0.5–1.4)
DIFFERENTIAL METHOD: ABNORMAL
EOSINOPHIL # BLD AUTO: 0 K/UL (ref 0–0.5)
EOSINOPHIL NFR BLD: 0.3 % (ref 0–8)
ERYTHROCYTE [DISTWIDTH] IN BLOOD BY AUTOMATED COUNT: 14.1 % (ref 11.5–14.5)
EST. GFR  (NO RACE VARIABLE): >60 ML/MIN/1.73 M^2
GLUCOSE SERPL-MCNC: 107 MG/DL (ref 70–110)
GLUCOSE SERPL-MCNC: 116 MG/DL (ref 70–110)
HCT VFR BLD AUTO: 45.1 % (ref 40–54)
HCT VFR BLD CALC: 43 %PCV (ref 36–54)
HGB BLD-MCNC: 15.6 G/DL (ref 14–18)
IMM GRANULOCYTES # BLD AUTO: 0.04 K/UL (ref 0–0.04)
IMM GRANULOCYTES NFR BLD AUTO: 0.5 % (ref 0–0.5)
LYMPHOCYTES # BLD AUTO: 1.5 K/UL (ref 1–4.8)
LYMPHOCYTES NFR BLD: 18.5 % (ref 18–48)
MAGNESIUM SERPL-MCNC: 2 MG/DL (ref 1.6–2.6)
MCH RBC QN AUTO: 31.1 PG (ref 27–31)
MCHC RBC AUTO-ENTMCNC: 34.6 G/DL (ref 32–36)
MCV RBC AUTO: 90 FL (ref 82–98)
MONOCYTES # BLD AUTO: 0.4 K/UL (ref 0.3–1)
MONOCYTES NFR BLD: 4.6 % (ref 4–15)
NEUTROPHILS # BLD AUTO: 5.9 K/UL (ref 1.8–7.7)
NEUTROPHILS NFR BLD: 75.2 % (ref 38–73)
NRBC BLD-RTO: 0 /100 WBC
PLATELET # BLD AUTO: 77 K/UL (ref 150–450)
PLATELET BLD QL SMEAR: ABNORMAL
PMV BLD AUTO: 10.7 FL (ref 9.2–12.9)
POC IONIZED CALCIUM: 1.04 MMOL/L (ref 1.06–1.42)
POC TCO2 (MEASURED): 27 MMOL/L (ref 23–27)
POTASSIUM BLD-SCNC: 2.9 MMOL/L (ref 3.5–5.1)
POTASSIUM SERPL-SCNC: 3.9 MMOL/L (ref 3.5–5.1)
PROT SERPL-MCNC: 7.5 G/DL (ref 6–8.4)
RBC # BLD AUTO: 5.02 M/UL (ref 4.6–6.2)
SAMPLE: ABNORMAL
SARS-COV-2 RDRP RESP QL NAA+PROBE: NEGATIVE
SMUDGE CELLS BLD QL SMEAR: PRESENT
SODIUM BLD-SCNC: 143 MMOL/L (ref 136–145)
SODIUM SERPL-SCNC: 141 MMOL/L (ref 136–145)
TROPONIN I SERPL DL<=0.01 NG/ML-MCNC: <0.006 NG/ML (ref 0–0.03)
WBC # BLD AUTO: 7.85 K/UL (ref 3.9–12.7)
WBC TOXIC VACUOLES BLD QL SMEAR: PRESENT

## 2023-09-29 PROCEDURE — 93005 ELECTROCARDIOGRAM TRACING: CPT

## 2023-09-29 PROCEDURE — 85025 COMPLETE CBC W/AUTO DIFF WBC: CPT | Performed by: EMERGENCY MEDICINE

## 2023-09-29 PROCEDURE — 96360 HYDRATION IV INFUSION INIT: CPT

## 2023-09-29 PROCEDURE — 93010 EKG 12-LEAD: ICD-10-PCS | Mod: ,,, | Performed by: INTERNAL MEDICINE

## 2023-09-29 PROCEDURE — 93010 ELECTROCARDIOGRAM REPORT: CPT | Mod: ,,, | Performed by: INTERNAL MEDICINE

## 2023-09-29 PROCEDURE — U0002 COVID-19 LAB TEST NON-CDC: HCPCS | Performed by: EMERGENCY MEDICINE

## 2023-09-29 PROCEDURE — 80053 COMPREHEN METABOLIC PANEL: CPT | Performed by: EMERGENCY MEDICINE

## 2023-09-29 PROCEDURE — 99285 EMERGENCY DEPT VISIT HI MDM: CPT | Mod: 25

## 2023-09-29 PROCEDURE — 83880 ASSAY OF NATRIURETIC PEPTIDE: CPT | Performed by: EMERGENCY MEDICINE

## 2023-09-29 PROCEDURE — 83735 ASSAY OF MAGNESIUM: CPT | Performed by: EMERGENCY MEDICINE

## 2023-09-29 PROCEDURE — 80048 BASIC METABOLIC PNL TOTAL CA: CPT | Mod: XB

## 2023-09-29 PROCEDURE — 25000003 PHARM REV CODE 250: Performed by: EMERGENCY MEDICINE

## 2023-09-29 PROCEDURE — 84484 ASSAY OF TROPONIN QUANT: CPT | Performed by: EMERGENCY MEDICINE

## 2023-09-29 RX ORDER — POTASSIUM CHLORIDE 750 MG/1
10 TABLET, EXTENDED RELEASE ORAL 2 TIMES DAILY
Qty: 14 TABLET | Refills: 0 | Status: SHIPPED | OUTPATIENT
Start: 2023-09-29 | End: 2023-09-30

## 2023-09-29 RX ADMIN — SODIUM CHLORIDE 500 ML: 9 INJECTION, SOLUTION INTRAVENOUS at 09:09

## 2023-09-29 RX ADMIN — POTASSIUM BICARBONATE 25 MEQ: 978 TABLET, EFFERVESCENT ORAL at 11:09

## 2023-09-30 ENCOUNTER — HOSPITAL ENCOUNTER (OUTPATIENT)
Facility: HOSPITAL | Age: 68
Discharge: HOME | End: 2023-09-30
Attending: EMERGENCY MEDICINE | Admitting: HOSPITALIST
Payer: MEDICARE

## 2023-09-30 VITALS
RESPIRATION RATE: 27 BRPM | OXYGEN SATURATION: 96 % | TEMPERATURE: 99 F | SYSTOLIC BLOOD PRESSURE: 124 MMHG | DIASTOLIC BLOOD PRESSURE: 79 MMHG | HEART RATE: 73 BPM

## 2023-09-30 DIAGNOSIS — R07.9 CHEST PAIN: ICD-10-CM

## 2023-09-30 DIAGNOSIS — R07.89 CHEST TIGHTNESS: Primary | ICD-10-CM

## 2023-09-30 LAB
ALBUMIN SERPL BCP-MCNC: 3.3 G/DL (ref 3.5–5.2)
ALP SERPL-CCNC: 69 U/L (ref 55–135)
ALT SERPL W/O P-5'-P-CCNC: 16 U/L (ref 10–44)
ANION GAP SERPL CALC-SCNC: 10 MMOL/L (ref 8–16)
AST SERPL-CCNC: 27 U/L (ref 10–40)
BASOPHILS # BLD AUTO: 0.01 K/UL (ref 0–0.2)
BASOPHILS NFR BLD: 0.1 % (ref 0–1.9)
BILIRUB SERPL-MCNC: 1.5 MG/DL (ref 0.1–1)
BNP SERPL-MCNC: 87 PG/ML (ref 0–99)
BUN SERPL-MCNC: 16 MG/DL (ref 8–23)
CALCIUM SERPL-MCNC: 8.4 MG/DL (ref 8.7–10.5)
CHLORIDE SERPL-SCNC: 107 MMOL/L (ref 95–110)
CO2 SERPL-SCNC: 23 MMOL/L (ref 23–29)
CREAT SERPL-MCNC: 0.9 MG/DL (ref 0.5–1.4)
DIFFERENTIAL METHOD: ABNORMAL
EOSINOPHIL # BLD AUTO: 0 K/UL (ref 0–0.5)
EOSINOPHIL NFR BLD: 0.1 % (ref 0–8)
ERYTHROCYTE [DISTWIDTH] IN BLOOD BY AUTOMATED COUNT: 14.3 % (ref 11.5–14.5)
EST. GFR  (NO RACE VARIABLE): >60 ML/MIN/1.73 M^2
GLUCOSE SERPL-MCNC: 98 MG/DL (ref 70–110)
HCT VFR BLD AUTO: 45.2 % (ref 40–54)
HCV AB SERPL QL IA: NORMAL
HGB BLD-MCNC: 15.5 G/DL (ref 14–18)
HIV 1+2 AB+HIV1 P24 AG SERPL QL IA: NORMAL
IMM GRANULOCYTES # BLD AUTO: 0.02 K/UL (ref 0–0.04)
IMM GRANULOCYTES NFR BLD AUTO: 0.3 % (ref 0–0.5)
LYMPHOCYTES # BLD AUTO: 1.4 K/UL (ref 1–4.8)
LYMPHOCYTES NFR BLD: 18.3 % (ref 18–48)
MCH RBC QN AUTO: 30.6 PG (ref 27–31)
MCHC RBC AUTO-ENTMCNC: 34.3 G/DL (ref 32–36)
MCV RBC AUTO: 89 FL (ref 82–98)
MONOCYTES # BLD AUTO: 0.2 K/UL (ref 0.3–1)
MONOCYTES NFR BLD: 3.1 % (ref 4–15)
NEUTROPHILS # BLD AUTO: 6.1 K/UL (ref 1.8–7.7)
NEUTROPHILS NFR BLD: 78.1 % (ref 38–73)
NRBC BLD-RTO: 0 /100 WBC
PLATELET # BLD AUTO: 168 K/UL (ref 150–450)
PMV BLD AUTO: 10 FL (ref 9.2–12.9)
POC CARDIAC TROPONIN I: 0 NG/ML (ref 0–0.08)
POC CARDIAC TROPONIN I: 0 NG/ML (ref 0–0.08)
POTASSIUM SERPL-SCNC: 4.3 MMOL/L (ref 3.5–5.1)
PROT SERPL-MCNC: 6.5 G/DL (ref 6–8.4)
RBC # BLD AUTO: 5.06 M/UL (ref 4.6–6.2)
SAMPLE: NORMAL
SAMPLE: NORMAL
SODIUM SERPL-SCNC: 140 MMOL/L (ref 136–145)
TROPONIN I SERPL DL<=0.01 NG/ML-MCNC: 0.01 NG/ML (ref 0–0.03)
TROPONIN I SERPL DL<=0.01 NG/ML-MCNC: <0.006 NG/ML (ref 0–0.03)
WBC # BLD AUTO: 7.77 K/UL (ref 3.9–12.7)

## 2023-09-30 PROCEDURE — 87389 HIV-1 AG W/HIV-1&-2 AB AG IA: CPT | Performed by: PHYSICIAN ASSISTANT

## 2023-09-30 PROCEDURE — 25000003 PHARM REV CODE 250

## 2023-09-30 PROCEDURE — 99222 PR INITIAL HOSPITAL CARE,LEVL II: ICD-10-PCS | Mod: GC,,, | Performed by: HOSPITALIST

## 2023-09-30 PROCEDURE — 83880 ASSAY OF NATRIURETIC PEPTIDE: CPT | Performed by: EMERGENCY MEDICINE

## 2023-09-30 PROCEDURE — 93010 ELECTROCARDIOGRAM REPORT: CPT | Mod: ,,, | Performed by: INTERNAL MEDICINE

## 2023-09-30 PROCEDURE — 99222 1ST HOSP IP/OBS MODERATE 55: CPT | Mod: GC,,, | Performed by: HOSPITALIST

## 2023-09-30 PROCEDURE — G0378 HOSPITAL OBSERVATION PER HR: HCPCS

## 2023-09-30 PROCEDURE — 99499 UNLISTED E&M SERVICE: CPT | Mod: ,,,

## 2023-09-30 PROCEDURE — 84484 ASSAY OF TROPONIN QUANT: CPT

## 2023-09-30 PROCEDURE — 80053 COMPREHEN METABOLIC PANEL: CPT | Performed by: EMERGENCY MEDICINE

## 2023-09-30 PROCEDURE — 85025 COMPLETE CBC W/AUTO DIFF WBC: CPT | Performed by: EMERGENCY MEDICINE

## 2023-09-30 PROCEDURE — 93005 ELECTROCARDIOGRAM TRACING: CPT

## 2023-09-30 PROCEDURE — 25000003 PHARM REV CODE 250: Performed by: HOSPITALIST

## 2023-09-30 PROCEDURE — 84484 ASSAY OF TROPONIN QUANT: CPT | Mod: 91 | Performed by: HOSPITALIST

## 2023-09-30 PROCEDURE — 99285 EMERGENCY DEPT VISIT HI MDM: CPT

## 2023-09-30 PROCEDURE — 84484 ASSAY OF TROPONIN QUANT: CPT | Mod: 91 | Performed by: EMERGENCY MEDICINE

## 2023-09-30 PROCEDURE — 99499 NO LOS: ICD-10-PCS | Mod: ,,,

## 2023-09-30 PROCEDURE — 93010 EKG 12-LEAD: ICD-10-PCS | Mod: ,,, | Performed by: INTERNAL MEDICINE

## 2023-09-30 PROCEDURE — 86803 HEPATITIS C AB TEST: CPT | Performed by: PHYSICIAN ASSISTANT

## 2023-09-30 RX ORDER — SODIUM CHLORIDE 0.9 % (FLUSH) 0.9 %
10 SYRINGE (ML) INJECTION
Status: DISCONTINUED | OUTPATIENT
Start: 2023-09-30 | End: 2023-09-30 | Stop reason: HOSPADM

## 2023-09-30 RX ORDER — SACUBITRIL AND VALSARTAN 97; 103 MG/1; MG/1
1 TABLET, FILM COATED ORAL 2 TIMES DAILY
COMMUNITY
End: 2023-10-12 | Stop reason: SDUPTHER

## 2023-09-30 RX ORDER — ACETAMINOPHEN 325 MG/1
650 TABLET ORAL EVERY 6 HOURS PRN
Status: DISCONTINUED | OUTPATIENT
Start: 2023-09-30 | End: 2023-09-30 | Stop reason: HOSPADM

## 2023-09-30 RX ORDER — MAG HYDROX/ALUMINUM HYD/SIMETH 200-200-20
30 SUSPENSION, ORAL (FINAL DOSE FORM) ORAL ONCE
Status: COMPLETED | OUTPATIENT
Start: 2023-09-30 | End: 2023-09-30

## 2023-09-30 RX ORDER — ATORVASTATIN CALCIUM 10 MG/1
10 TABLET, FILM COATED ORAL DAILY
Status: DISCONTINUED | OUTPATIENT
Start: 2023-09-30 | End: 2023-09-30 | Stop reason: HOSPADM

## 2023-09-30 RX ORDER — LIDOCAINE HYDROCHLORIDE 20 MG/ML
15 SOLUTION OROPHARYNGEAL ONCE
Status: COMPLETED | OUTPATIENT
Start: 2023-09-30 | End: 2023-09-30

## 2023-09-30 RX ORDER — SPIRONOLACTONE 25 MG/1
25 TABLET ORAL DAILY
Status: DISCONTINUED | OUTPATIENT
Start: 2023-09-30 | End: 2023-09-30 | Stop reason: HOSPADM

## 2023-09-30 RX ORDER — CARVEDILOL 12.5 MG/1
12.5 TABLET ORAL 2 TIMES DAILY WITH MEALS
Status: DISCONTINUED | OUTPATIENT
Start: 2023-09-30 | End: 2023-09-30 | Stop reason: HOSPADM

## 2023-09-30 RX ADMIN — CARVEDILOL 12.5 MG: 12.5 TABLET, FILM COATED ORAL at 07:09

## 2023-09-30 RX ADMIN — ATORVASTATIN CALCIUM 10 MG: 10 TABLET, FILM COATED ORAL at 10:09

## 2023-09-30 RX ADMIN — LIDOCAINE HYDROCHLORIDE 15 ML: 20 SOLUTION ORAL at 02:09

## 2023-09-30 RX ADMIN — ALUMINUM HYDROXIDE, MAGNESIUM HYDROXIDE, AND SIMETHICONE 30 ML: 200; 200; 20 SUSPENSION ORAL at 02:09

## 2023-09-30 RX ADMIN — ACETAMINOPHEN 650 MG: 325 TABLET ORAL at 09:09

## 2023-09-30 RX ADMIN — SACUBITRIL AND VALSARTAN 1 TABLET: 24; 26 TABLET, FILM COATED ORAL at 10:09

## 2023-09-30 RX ADMIN — SPIRONOLACTONE 25 MG: 25 TABLET, FILM COATED ORAL at 09:09

## 2023-09-30 NOTE — HPI
68-year-old male with past medical history of nonischemic cardiomyopathy secondary to alcohol abuse recent EF of 40%, hypertension, sick sinus syndrome status post pacemaker which was upgraded to ICD after he was diagnosed with heart failure presents for chest tightness.  Patient was when emergency room yesterday for syncopal event.  His device was interrogated and was negative for arrhythmic events.  Patient after going home had a lemonade and was relaxing and started to feel chest tightness which lasted for 2 hours and improved only after receiving GI cocktail.  EKG no ST T wave changes.  Patient denies having any shortness of breath diaphoresis associated with chest tightness.  He had a PET stress that in 2020 that was negative for ischemia.  Patient also denies having any orthopnea PND bilateral lower edema.  Patient also complains of having diarrhea since yesterday

## 2023-09-30 NOTE — ED TRIAGE NOTES
Ramesh Acosta, a 68 y.o. male presents to the ED w/ complaint of chest pain. Patient states having mid sternal chest pain that is a 7 out of 10, does not radiate. EMS gave 324 mg of Asprin. Patient was discharged earlier for syncopal episode and sent home with prescription for potassium. EMS did not give nitro due to patient's blood pressure being on the lower side. Patient endorses having 2 episodes of diarrhea.     Triage note:  Chief Complaint   Patient presents with    Chest Pain     D/c earlier for syncopal episode and given K. At 0030 pt reports CP 7/10 midsternal, nonradiating. Received 324 aspirin with EMS.      Review of patient's allergies indicates:  No Known Allergies  No past medical history on file.

## 2023-09-30 NOTE — ASSESSMENT & PLAN NOTE
Presents with chest tightness lasted for 2 hours which improved with GI cocktail.  EKG no ST T wave abnormalities and troponins not elevated.  Likely his chest tightness secondary to GI etiology.  Had previous stress test that were negative for ischemia  Recheck troponin if it is negative patient can be discharged home

## 2023-09-30 NOTE — PHARMACY MED REC
"Admission Medication History     The home medication history was taken by Leydi Powell.    You may go to "Admission" then "Reconcile Home Medications" tabs to review and/or act upon these items.     The home medication list has been updated by the Pharmacy department.   Please read ALL comments highlighted in yellow.   Please address this information as you see fit.    Feel free to contact us if you have any questions or require assistance.      The medications listed below were removed from the home medication list. Please reorder if appropriate:  Patient reports no longer taking the following medication(s):  POTASSIUM CHLORIDE 10 MEQ TABLET      Current Outpatient Medications on File Prior to Encounter   Medication Sig    carvediloL (COREG) 12.5 MG tablet   Take 12.5 mg by mouth 2 (two) times daily with meals.    empagliflozin (JARDIANCE) 10 mg tablet   Take 10 mg by mouth once daily.    fluticasone propionate (FLONASE) 50 mcg/actuation nasal spray   Instill 1 spray by each nostril route once daily.    furosemide (LASIX) 20 MG tablet   Take 20 mg by mouth once daily.    hydrOXYzine HCL (ATARAX) 25 MG tablet   Take 25 mg by mouth nightly.    insulin glargine (LANTUS) 100 unit/mL injection   Inject 10 Units into the skin every evening.    levocetirizine (XYZAL) 5 MG tablet   Take 5 mg by mouth every evening.    rosuvastatin (CRESTOR) 5 MG tablet   Take 5 mg by mouth once daily.    sacubitriL-valsartan (ENTRESTO)  mg per tablet   Take 1 tablet by mouth 2 (two) times daily.    spironolactone (ALDACTONE) 25 MG tablet   Take 12.5 mg by mouth once daily.     Potential issues to be addressed PRIOR TO DISCHARGE  Patient requires education regarding drug therapies     Leydi Powell  [id number]                  .          "

## 2023-09-30 NOTE — SUBJECTIVE & OBJECTIVE
History reviewed. No pertinent past medical history.    History reviewed. No pertinent surgical history.    Review of patient's allergies indicates:  No Known Allergies    Current Facility-Administered Medications on File Prior to Encounter   Medication    [COMPLETED] potassium bicarbonate disintegrating tablet 25 mEq    [COMPLETED] sodium chloride 0.9% bolus 500 mL 500 mL     Current Outpatient Medications on File Prior to Encounter   Medication Sig    carvediloL (COREG) 12.5 MG tablet Take 12.5 mg by mouth 2 (two) times daily with meals.    fluticasone propionate (FLONASE) 50 mcg/actuation nasal spray 1 spray by Each Nostril route once daily.    furosemide (LASIX) 20 MG tablet Take 20 mg by mouth 2 (two) times daily.    hydrOXYzine HCL (ATARAX) 25 MG tablet Take 25 mg by mouth 3 (three) times daily as needed for Itching.    insulin glargine (LANTUS) 100 unit/mL injection Inject 10 Units into the skin every evening.    levocetirizine (XYZAL) 5 MG tablet Take 5 mg by mouth every evening.    potassium chloride (KLOR-CON) 10 MEQ TbSR Take 1 tablet (10 mEq total) by mouth 2 (two) times daily. for 7 days    rosuvastatin (CRESTOR) 5 MG tablet Take 5 mg by mouth once daily.    spironolactone (ALDACTONE) 25 MG tablet Take 25 mg by mouth once daily.     Family History    None       Tobacco Use    Smoking status: Never     Passive exposure: Never    Smokeless tobacco: Never   Substance and Sexual Activity    Alcohol use: Not on file    Drug use: Not on file    Sexual activity: Not on file     Review of Systems   Constitutional: Negative.    HENT: Negative.     Eyes: Negative.    Respiratory:  Positive for chest tightness.    Cardiovascular: Negative.    Gastrointestinal: Negative.    Genitourinary: Negative.    Musculoskeletal: Negative.    Neurological: Negative.      Objective:     Vital Signs (Most Recent):  Temp: 99.7 °F (37.6 °C) (09/30/23 0344)  Pulse: 89 (09/30/23 0426)  Resp: 20 (09/30/23 0426)  BP: (!) 149/92  (09/30/23 0418)  SpO2: 98 % (09/30/23 0426) Vital Signs (24h Range):  Temp:  [98.4 °F (36.9 °C)-99.7 °F (37.6 °C)] 99.7 °F (37.6 °C)  Pulse:  [69-89] 89  Resp:  [13-20] 20  SpO2:  [95 %-100 %] 98 %  BP: ()/(56-96) 149/92        There is no height or weight on file to calculate BMI.     Physical Exam  Constitutional:       Appearance: Normal appearance.   HENT:      Head: Normocephalic and atraumatic.   Eyes:      Extraocular Movements: Extraocular movements intact.      Pupils: Pupils are equal, round, and reactive to light.   Cardiovascular:      Rate and Rhythm: Normal rate and regular rhythm.   Pulmonary:      Effort: Pulmonary effort is normal.      Breath sounds: Normal breath sounds.   Abdominal:      General: Abdomen is flat. Bowel sounds are normal.      Palpations: Abdomen is soft.   Musculoskeletal:         General: Normal range of motion.      Cervical back: Normal range of motion.   Skin:     General: Skin is warm.      Capillary Refill: Capillary refill takes less than 2 seconds.   Neurological:      General: No focal deficit present.      Mental Status: He is alert and oriented to person, place, and time.              CRANIAL NERVES     CN III, IV, VI   Pupils are equal, round, and reactive to light.       Significant Labs: All pertinent labs within the past 24 hours have been reviewed.    Significant Imaging: I have reviewed all pertinent imaging results/findings within the past 24 hours.

## 2023-09-30 NOTE — ED NOTES
Pt. Rounded on. Pt. Resting comfortably in bed with both side rails up. Pt. Has call bell and all belongings in reach at bedside. Patient asked if they needed to go to the restroom. Patient updated on treatment plan, patient verbalized understanding. Pt. Remains hooked up to continuous BP, pulse ox, and Cardiac monitoring at this time. Will continue to monitor patient at this time.

## 2023-09-30 NOTE — H&P
[facility] - Emergency Dept  Hospital Medicine  History & Physical    Patient Name: Ramesh Acosta  MRN: [id number]  Patient Class: OP- Observation  Admission Date: 9/30/2023  Attending Physician: Ronny Cortez MD   Primary Care Provider: [facility]         Patient information was obtained from patient and ER records.     Subjective:     Principal Problem:Chest tightness    Chief Complaint:   Chief Complaint   Patient presents with    Chest Pain     D/c earlier for syncopal episode and given K. At 0030 pt reports CP 7/10 midsternal, nonradiating. Received 324 aspirin with EMS.         HPI: 68-year-old male with past medical history of nonischemic cardiomyopathy secondary to alcohol abuse recent EF of 40%, hypertension, sick sinus syndrome status post pacemaker which was upgraded to ICD after he was diagnosed with heart failure presents for chest tightness.  Patient was when emergency room yesterday for syncopal event.  His device was interrogated and was negative for arrhythmic events.  Patient after going home had a lemonade and was relaxing and started to feel chest tightness which lasted for 2 hours and improved only after receiving GI cocktail.  EKG no ST T wave changes.  Patient denies having any shortness of breath diaphoresis associated with chest tightness.  He had a PET stress that in 2020 that was negative for ischemia.  Patient also denies having any orthopnea PND bilateral lower edema.  Patient also complains of having diarrhea since yesterday      History reviewed. No pertinent past medical history.    History reviewed. No pertinent surgical history.    Review of patient's allergies indicates:  No Known Allergies    Current Facility-Administered Medications on File Prior to Encounter   Medication    [COMPLETED] potassium bicarbonate disintegrating tablet 25 mEq    [COMPLETED] sodium chloride 0.9% bolus 500 mL 500 mL     Current Outpatient Medications on File Prior to Encounter    Medication Sig    carvediloL (COREG) 12.5 MG tablet Take 12.5 mg by mouth 2 (two) times daily with meals.    fluticasone propionate (FLONASE) 50 mcg/actuation nasal spray 1 spray by Each Nostril route once daily.    furosemide (LASIX) 20 MG tablet Take 20 mg by mouth 2 (two) times daily.    hydrOXYzine HCL (ATARAX) 25 MG tablet Take 25 mg by mouth 3 (three) times daily as needed for Itching.    insulin glargine (LANTUS) 100 unit/mL injection Inject 10 Units into the skin every evening.    levocetirizine (XYZAL) 5 MG tablet Take 5 mg by mouth every evening.    potassium chloride (KLOR-CON) 10 MEQ TbSR Take 1 tablet (10 mEq total) by mouth 2 (two) times daily. for 7 days    rosuvastatin (CRESTOR) 5 MG tablet Take 5 mg by mouth once daily.    spironolactone (ALDACTONE) 25 MG tablet Take 25 mg by mouth once daily.     Family History    None       Tobacco Use    Smoking status: Never     Passive exposure: Never    Smokeless tobacco: Never   Substance and Sexual Activity    Alcohol use: Not on file    Drug use: Not on file    Sexual activity: Not on file     Review of Systems   Constitutional: Negative.    HENT: Negative.     Eyes: Negative.    Respiratory:  Positive for chest tightness.    Cardiovascular: Negative.    Gastrointestinal: Negative.    Genitourinary: Negative.    Musculoskeletal: Negative.    Neurological: Negative.      Objective:     Vital Signs (Most Recent):  Temp: 99.7 °F (37.6 °C) (09/30/23 0344)  Pulse: 89 (09/30/23 0426)  Resp: 20 (09/30/23 0426)  BP: (!) 149/92 (09/30/23 0418)  SpO2: 98 % (09/30/23 0426) Vital Signs (24h Range):  Temp:  [98.4 °F (36.9 °C)-99.7 °F (37.6 °C)] 99.7 °F (37.6 °C)  Pulse:  [69-89] 89  Resp:  [13-20] 20  SpO2:  [95 %-100 %] 98 %  BP: ()/(56-96) 149/92        There is no height or weight on file to calculate BMI.     Physical Exam  Constitutional:       Appearance: Normal appearance.   HENT:      Head: Normocephalic and atraumatic.   Eyes:       Extraocular Movements: Extraocular movements intact.      Pupils: Pupils are equal, round, and reactive to light.   Cardiovascular:      Rate and Rhythm: Normal rate and regular rhythm.   Pulmonary:      Effort: Pulmonary effort is normal.      Breath sounds: Normal breath sounds.   Abdominal:      General: Abdomen is flat. Bowel sounds are normal.      Palpations: Abdomen is soft.   Musculoskeletal:         General: Normal range of motion.      Cervical back: Normal range of motion.   Skin:     General: Skin is warm.      Capillary Refill: Capillary refill takes less than 2 seconds.   Neurological:      General: No focal deficit present.      Mental Status: He is alert and oriented to person, place, and time.              CRANIAL NERVES     CN III, IV, VI   Pupils are equal, round, and reactive to light.       Significant Labs: All pertinent labs within the past 24 hours have been reviewed.    Significant Imaging: I have reviewed all pertinent imaging results/findings within the past 24 hours.    Assessment/Plan:     * Chest tightness    Presents with chest tightness lasted for 2 hours which improved with GI cocktail.  EKG no ST T wave abnormalities and troponins not elevated.  Likely his chest tightness secondary to GI etiology.  Had previous stress test that were negative for ischemia  Recheck troponin if it is negative patient can be discharged home    Type 2 diabetes mellitus without complication, with long-term current use of insulin  Sliding scale and a.c. HS    Nonischemic cardiomyopathy    Currently appears compensated.  Continue carvedilol, Entresto, spironolactone.  Will hold Jardiance given that he is having diarrhea      VTE Risk Mitigation (From admission, onward)    None                 On 09/30/2023, patient should be placed in hospital observation services      Kai Rodriges MD  Department of Hospital Medicine  [facility] - Emergency Dept

## 2023-09-30 NOTE — ED PROVIDER NOTES
Encounter Date: 9/30/2023       History     Chief Complaint   Patient presents with    Chest Pain     D/c earlier for syncopal episode and given K. At 0030 pt reports CP 7/10 midsternal, nonradiating. Received 324 aspirin with EMS.      67 yo M with pmhx NiCMP (EF 20->40%) s/p ICD ([employer] [place] XT DR GEORGE Dodson), SSS, IDDM, HTN, HLD, glomerular nephritis, gout, obesity presents to the ED complaining of midsternal chest pain that onset at midnight while the patient was sitting in a chair looking in his computer. He stated the pain to be a 4/10 in terms of intensity but when it onset it was a 7/10 and he describes the pain to be pressure-like. He also had 4 episodes of nonbloody diarrhea, intermittent shortness of breath, chills and nausea. He received 324 mg of aspirin via EMS. He denies abdominal pain, abdominal dyspneic and hematuria.  Patient was recently discharged from this emergency department after workup for syncope.    The history is provided by the patient and medical records. No  was used.     Review of patient's allergies indicates:  No Known Allergies  History reviewed. No pertinent past medical history.  History reviewed. No pertinent surgical history.  History reviewed. No pertinent family history.  Social History     Tobacco Use    Smoking status: Never     Passive exposure: Never    Smokeless tobacco: Never         Physical Exam     Initial Vitals [09/30/23 0139]   BP Pulse Resp Temp SpO2   108/75 83 16 99 °F (37.2 °C) 95 %      MAP       --         Physical Exam    Nursing note and vitals reviewed.  Constitutional: Vital signs are normal. He appears well-developed and well-nourished. He is not diaphoretic. No distress.   HENT:   Head: Normocephalic and atraumatic.   Right Ear: External ear normal.   Left Ear: External ear normal.   Eyes: EOM are normal. Right eye exhibits no discharge. Left eye exhibits no discharge.   Neck: Trachea normal. Neck supple. No thyroid mass  present.   Normal range of motion.  Cardiovascular:  Normal rate, regular rhythm, normal heart sounds and intact distal pulses.     Exam reveals no gallop and no friction rub.       No murmur heard.  Pulmonary/Chest: Breath sounds normal. No respiratory distress. He has no wheezes. He has no rhonchi. He has no rales. He exhibits no tenderness.   Abdominal: Abdomen is soft. Bowel sounds are normal. He exhibits no distension. There is no abdominal tenderness. There is no rebound and no guarding.   Musculoskeletal:         General: No tenderness or edema. Normal range of motion.      Cervical back: Normal range of motion and neck supple.     Neurological: He is alert and oriented to person, place, and time. He has normal strength. No cranial nerve deficit or sensory deficit. GCS score is 15. GCS eye subscore is 4. GCS verbal subscore is 5. GCS motor subscore is 6.   Skin: Skin is warm and dry. Capillary refill takes less than 2 seconds. No rash noted.   Psychiatric: He has a normal mood and affect.         ED Course   Procedures  Labs Reviewed   CBC W/ AUTO DIFFERENTIAL - Abnormal; Notable for the following components:       Result Value    Mono # 0.2 (*)     Gran % 78.1 (*)     Mono % 3.1 (*)     All other components within normal limits    Narrative:     Release to patient->Immediate   COMPREHENSIVE METABOLIC PANEL - Abnormal; Notable for the following components:    Calcium 8.4 (*)     Albumin 3.3 (*)     Total Bilirubin 1.5 (*)     All other components within normal limits   TROPONIN I    Narrative:     Release to patient->Immediate   TROPONIN I   B-TYPE NATRIURETIC PEPTIDE    Narrative:     Release to patient->Immediate   HIV 1 / 2 ANTIBODY    Narrative:     Release to patient->Immediate   HEPATITIS C ANTIBODY    Narrative:     Release to patient->Immediate   TROPONIN ISTAT   TROPONIN I   TROPONIN ISTAT   TROPONIN I   POCT TROPONIN   POCT TROPONIN     EKG Readings: (Independently Interpreted)   Initial Reading: No  STEMI. Rhythm: Normal Sinus Rhythm. Heart Rate: 85. Ectopy: No Ectopy. Clinical Impression: Normal Sinus Rhythm   85 beats per minute.  Normal sinus rhythm.  Normal axis.  No obvious ST segment or T-wave ischemic changes.       Imaging Results    None          Medications   carvediloL tablet 12.5 mg (12.5 mg Oral Given 9/30/23 0715)   atorvastatin tablet 10 mg (has no administration in time range)   spironolactone tablet 25 mg (has no administration in time range)   sacubitriL-valsartan 24-26 mg per tablet 1 tablet (has no administration in time range)   sodium chloride 0.9% flush 10 mL (has no administration in time range)   aluminum-magnesium hydroxide-simethicone 200-200-20 mg/5 mL suspension 30 mL (30 mLs Oral Given 9/30/23 0254)     And   LIDOcaine HCl 2% oral solution 15 mL (15 mLs Oral Given 9/30/23 0254)     Medical Decision Making  68-year-old male who appears to be in NAD presents to the ED complaining of substernal chest pain.  ABC's intact.  Initial triage vital signs are within normal limits.    Differential diagnosis includes but is not limited to ACS, arrhythmia, pulmonary embolism, pneumonia, pneumothorax, musculoskeletal pain, GERD. Considered PE, but lower suspicion, no tachycardia or hypoxia.     Amount and/or Complexity of Data Reviewed  External Data Reviewed: radiology.     Details: CXR from earlier with no acute findings     ECHO    Procedure(s):     Limited two-dimensional, Doppler interrogation and color flow transthoracic   echocardiogram is performed with contrast. Definity explained to patient.   Patient verbalizes understanding and agrees to proceed. Definity 1.3ml/8.7ml   normal sterile saline 2 ml total given IV over 30-60 seconds.     Left Ventricle     Left ventricular chamber dimension is normal. Left ventricular systolic   function is mildly reduced, with ejection fraction estimated at 40 +/- 5%.   Normal left ventricular mass. Abnormal septal motion consistent with an    intraventricular conduction defect.     Right Ventricle     Right ventricular chamber dimension is enlarged. A device wire is seen in   the right ventricle.       Tricuspid Valve     The tricuspid valve leaflets are not well visualized. There is trace   tricuspid valve regurgitation. There is no pulmonary hypertension, estimated   right ventricle systolic pressure is 21 mmHg.       Pericardium/Pleural     There is a trivial pericardial effusion.     Inferior Vena Cava     Normal inferior vena cava with >50% collapse upon inspiration consistent   with normal right atrial pressure.     Aorta     The aortic measurements are indexed to age and body surface area. The aortic   root is normal measuring 3.2 cm with an index of 1.5 cm/m2.      Labs: ordered. Decision-making details documented in ED Course.  ECG/medicine tests: ordered and independent interpretation performed.    Risk  OTC drugs.  Prescription drug management.  Decision regarding hospitalization.      Additional MDM:     Well's Criteria Score:  -Clinical symptoms of DVT (leg swelling, pain with palpation) = 0.0  -PE is #1 diagnosis OR equally likely =            0.0  -Heart Rate >100 =   0.0  -Immobilization (= or > than 3 days) or surgery in the previous 4 weeks = 0.0  -Previous DVT/PE = 0.0  -Hemoptysis =          0.0  -Malignancy =           0.0  Well's Probability Score =    0      Heart Score:    History:          Moderately suspicious.  ECG:             Nonspecific repolarisation disturbance  Age:               >65 years  Risk factors: >= 3 risk factors or history of atherosclerotic disease  Troponin:       Less than or equal to normal limit  Heart Score = 6               Attending Attestation:   Physician Attestation Statement for Resident:  As the supervising MD   Physician Attestation Statement: I have personally seen and examined this patient.   I agree with the above history.  -: 67 yo male presenting with chest tightness, substernal,  non-radiating. No chest injury.   Evaluated earlier for syncopal episode, device interrogated by radiology.  No head injury. Did not feel his AICD fire.    As the supervising MD I agree with the above PE.   -: No distress  Lungs clear  Right chest wall device palpable  RRR  Moving all extremities equally  Non-tender chest wall   Intact and equal distal pulses   Non-tender abdomen    As the supervising MD I agree with the above treatment, course, plan, and disposition.   -: Initial troponin negative. Received ASA by EMS. Tightness improving.   Given patient's history with recent syncope, will admit for ACS r/o, further w/u.   Patient agrees with plan for admission.   Doubt aortic pathology given pulse exam.    I have reviewed and agree with the residents interpretation of the following: lab data and EKG.  I have reviewed the following: old records at this facility.                ED Course as of 09/30/23 0735   Sat Sep 30, 2023   0223 Following initial evaluation I will obtain chest pain labs and give the patient a GI cocktail.  EKGs relatively unremarkable.  I will follow along with troponins and remaining lab results. [BG]   0238 POC Cardiac Troponin I: 0.00 [BG]   0253 CBC auto differential(!)  Grossly unremarkable.  No leukocytosis, no anemia. [BG]   0324 Troponin I: <0.006  Decreases likelihood in the setting of an unremarkable EKG.  Will obtain 2nd troponin for a delta. [BG]   0330 BNP: 87  Decreases likelihood of a CHF exacerbation.  Patient does not appear fluid overloaded on exam. [BG]   0339 WBC: 7.77  No leukocytosis  [AB]   0340 Troponin I: <0.006  Normal  [AB]   0351 Chest x-ray performed during previous ED visit a few hours ago shows no acute process. [BG]   0353 On repeat evaluation patient reports that the GI cocktail helped his pain.  I discussed with the patient that he will likely be admitted due to his heart score and past medical history.  He understands and agrees with the plan. [BG]   0417  Calcium(!): 8.4  Corrected calcium in the setting of hypoalbuminemia is 9.0 mg/dL. [BG]   0417 Comprehensive metabolic panel(!)  Grossly unremarkable CMP. [BG]   0423 Patient admitted to Hospital Medicine under observation designation for ACS rule out.  Patient understands and agrees with the plan. [BG]      ED Course User Index  [AB] Chapito Dexter MD  [BG] Alejandra Huizar MD                    Clinical Impression:   Final diagnoses:  [R07.9] Chest pain        ED Disposition Condition    Observation                 Alejandra Huizar MD  Resident  09/30/23 7606              Chapito Dexter MD  09/30/23 7081

## 2023-09-30 NOTE — ED NOTES
I-STAT Chem-8+ Results:   Value Reference Range   Sodium 143 136-145 mmol/L   Potassium  2.9 3.5-5.1 mmol/L   Chloride 102  mmol/L   Ionized Calcium 1.04 1.06-1.42 mmol/L   CO2 (measured) 27 23-29 mmol/L   Glucose 116  mg/dL   BUN 17 6-30 mg/dL   Creatinine 1.1 0.5-1.4 mg/dL   Hematocrit 43 36-54%

## 2023-09-30 NOTE — ASSESSMENT & PLAN NOTE
Currently appears compensated.  Continue carvedilol, Entresto, spironolactone.  Will hold Jardiance given that he is having diarrhea

## 2023-09-30 NOTE — ED TRIAGE NOTES
"Pt arrives to ED via EMS stating while at dinner he felt sweaty and clammy. Pt states he then lost consciousness. Pt does not know how long he lost consciousness for but believes it was seconds. Pt denies falling or head trauma. Pt states he feels "totally fine."  "

## 2023-09-30 NOTE — ED PROVIDER NOTES
"Encounter Date: 9/29/2023       History     Chief Complaint   Patient presents with    Loss of Consciousness     Sitting up eating when had witnessed syncopal episode. Pt initially 90/0 and diaphoretic on EMS arrival, 250 NS in route. Denies falling/hitting head. Hx CHF, ICD.      67 yo M with pmhx NiCMP (EF 20->40%) s/p ICD (Medtronic [place] XT DR GEORGE Dodson), SSS, IDDM, HTN, HLD, glomerular nephritis, gout, obesity presents with a chief complaint of syncope.  History was assisted by the patient's wife.  Syncope occurred this evening after dinner.  Patient had not eaten or drank much today which is normal.  He had a Margaret and a hot dog.  He noted that he was "feeling bad" to his wife and then had a syncopal episode.  He came to but it took approximately 90 seconds to returned to baseline.  Wife noted him to be diaphoretic.  Patient did not recall the episode.  On EMS arrival he was diaphoretic with a blood pressure of 90/100.  He received 250 cc of saline and reports feeling at baseline.  No chest pain, fever, cough, recent illness.  He notes a previous episode of syncope at which time he had a dysrhythmia.  He had another episode when he had COVID.  He had another episode with uncertain etiology.  He recently moved from [place] and transferring his care to [facility].  He did not feel his defibrillator fire.        Review of patient's allergies indicates:  No Known Allergies  History reviewed. No pertinent past medical history.  History reviewed. No pertinent surgical history.  History reviewed. No pertinent family history.  Social History     Tobacco Use    Smoking status: Never     Passive exposure: Never    Smokeless tobacco: Never     Review of Systems    Physical Exam     Initial Vitals [09/29/23 1945]   BP Pulse Resp Temp SpO2   (!) 96/56 77 20 98.4 °F (36.9 °C) 100 %      MAP       --         Physical Exam    Nursing note and vitals reviewed.  Constitutional: He appears well-developed and well-nourished. He " is not diaphoretic. No distress.   HENT:   Head: Normocephalic and atraumatic.   Eyes: Right eye exhibits no discharge. Left eye exhibits no discharge. No scleral icterus.   Neck: Neck supple. No JVD present.   Normal range of motion.  Cardiovascular:  Normal rate, regular rhythm and normal heart sounds.     Exam reveals no gallop and no friction rub.       No murmur heard.  Pulmonary/Chest: Breath sounds normal. No respiratory distress. He has no wheezes. He has no rhonchi. He has no rales.   Abdominal: Abdomen is soft. He exhibits no distension and no mass. There is no abdominal tenderness. There is no rebound and no guarding.   Musculoskeletal:         General: No tenderness. Normal range of motion.      Cervical back: Normal range of motion and neck supple.     Neurological: He is alert and oriented to person, place, and time. He has normal strength. No sensory deficit.   Skin: Skin is warm and dry. Capillary refill takes less than 2 seconds.   Psychiatric: Thought content normal.         ED Course   Procedures  Labs Reviewed   COMPREHENSIVE METABOLIC PANEL - Abnormal; Notable for the following components:       Result Value    Total Bilirubin 1.5 (*)     All other components within normal limits   B-TYPE NATRIURETIC PEPTIDE - Abnormal; Notable for the following components:     (*)     All other components within normal limits   CBC W/ AUTO DIFFERENTIAL - Abnormal; Notable for the following components:    MCH 31.1 (*)     Platelets 77 (*)     Gran % 75.2 (*)     Platelet Estimate Decreased (*)     All other components within normal limits   ISTAT PROCEDURE - Abnormal; Notable for the following components:    POC Glucose 116 (*)     POC Potassium 2.9 (*)     POC Ionized Calcium 1.04 (*)     All other components within normal limits   TROPONIN I   MAGNESIUM   SARS-COV-2 RNA AMPLIFICATION, QUAL   ISTAT CHEM8     EKG Readings: (Independently Interpreted)   Initial Reading: No STEMI. Rhythm: Normal Sinus  Rhythm. Heart Rate: 72. ST Segments: Normal ST Segments. T Waves: Normal. Axis: Normal.       Imaging Results              X-Ray Chest AP Portable (Final result)  Result time 09/29/23 21:45:06      Final result by Yaya Mckenna MD (09/29/23 21:45:06)                   Impression:      No acute process.    Right AICD in place.  Interrogation of the AICD, as clinically warranted.      Electronically signed by: Yaya Mckenna MD  Date:    09/29/2023  Time:    21:45               Narrative:    EXAMINATION:  XR CHEST AP PORTABLE    CLINICAL HISTORY:  Chest Pain;    TECHNIQUE:  Single frontal view of the chest was performed.    COMPARISON:  None    FINDINGS:  There is a right-sided dual lead AICD.  The proximal lead terminates over the right atrial appendage.  The distal the over the left cardiac silhouette.  Monitoring EKG leads are present.    The trachea is unremarkable.  The cardiomediastinal silhouette is within normal limits.  There is no evidence of free air beneath the hemidiaphragms.  There are no pleural effusions.  There is no evidence of a pneumothorax.  There is no evidence of pneumomediastinum.  No airspace opacity is present.  The osseous structures are unremarkable.                                       Medications   potassium bicarbonate disintegrating tablet 25 mEq (has no administration in time range)   sodium chloride 0.9% bolus 500 mL 500 mL (0 mLs Intravenous Stopped 9/29/23 2217)     Medical Decision Making  67 yo M with pmhx NiCMP (EF 20->40%) s/p ICD (Medtronic [place] XT DR MRI SureScan), SSS, IDDM, HTN, HLD, glomerular nephritis, gout, obesity presents with a chief complaint of syncope.  Patient's blood pressure still borderline at 90s over 50s.    My differential includes, but is not limited to:  Dysrhythmia, ACS, electrolyte abnormalities, anemia, hypovolemia, COVID    Will obtain orthostatic vitals.  Will administer small 500 cc bolus.  Will obtain labs, chest x-ray.  Will perform device  interrogation.    Reassessment:  Vital signs in ED were not orthostatic.  CBC without leukocytosis or anemia.  COVID is negative.  CMP with slight elevation in total bilirubin.  Potassium 3.9 is hemolyzed, repeat i-STAT was 2.9.  Troponin is normal.  Magnesium is normal.  Chest x-ray without acute process.  Device was interrogated by cardiology fellow without any dysrhythmias.  Patient was observed in the ED without any dysrhythmias or presyncope or syncope.  Given his cardiac history, he was offered observation but declined.  He has capacity to make his medical decisions.  He was given oral potassium in the ED and given a 1 week course of outpatient oral replacement.  He was advised repeat potassium draw and follow-up with PCP within a week.  He is comfortable with that plan.  Provided with extensive return precautions      Amount and/or Complexity of Data Reviewed  Labs: ordered.  Radiology: ordered.    Risk  Prescription drug management.                             Clinical Impression:   Final diagnoses:  [R55] Syncope (Primary)  [E87.6] Hypokalemia        ED Disposition Condition    Discharge Stable          ED Prescriptions       Medication Sig Dispense Start Date End Date Auth. Provider    potassium chloride (KLOR-CON) 10 MEQ TbSR Take 1 tablet (10 mEq total) by mouth 2 (two) times daily. for 7 days 14 tablet 9/29/2023 10/6/2023 Palomo Nash MD          Follow-up Information       Follow up With Specialties Details Why Contact Info    [facility] Emergency Medicine  As needed, If symptoms worsen [id number]  [facility] [id number]  [phone]             Palomo Nash MD  09/29/23 0430

## 2023-09-30 NOTE — PLAN OF CARE
[facility]  Initial Discharge Assessment       Primary Care Provider: Reyna, Primary Doctor    Admission Diagnosis: Chest pain [R07.9]    Admission Date: 9/30/2023  Expected Discharge Date:     Pt stated he is independent with his ambulation and ADL's and does not use equipment or require assistance    Pt to d/c home with no needs when ready    Transition of Care Barriers: (P) None    Payor: MEDICARE / Plan: MEDICARE PART A & B / Product Type: Government /     Extended Emergency Contact Information  Primary Emergency Contact: Meena Acosta  Address: [address] [place] [address] [place]  Mobile Phone: [phone]  Relation: Spouse  [facility]: [facility]  Address: [address]  Mobile Phone: [phone]  Relation: Sister    Discharge Plan A: (P) Home  Discharge Plan B: (P) Home      [id number] [place], [place] [facility] [address]  [address] [address]  Phone: [phone] Fax: [phone]      Initial Assessment (most recent)       Adult Discharge Assessment - 09/30/23 0906          Discharge Assessment    Assessment Type Discharge Planning Assessment     Confirmed/corrected address, phone number and insurance Yes     Confirmed Demographics Correct on Facesheet     Source of Information patient     Reason For Admission Chest tightness (P)      People in Home spouse (P)      Facility Arrived From: home (P)      Do you expect to return to your current living situation? Yes (P)      Do you have help at home or someone to help you manage your care at home? No (P)      Prior to hospitilization cognitive status: Alert/Oriented;No Deficits (P)      Current cognitive status: Alert/Oriented;No Deficits (P)      Home Accessibility stairs to enter home (P)      Number of Stairs, Main Entrance four (P)      Home Layout Able to live on 1st floor (P)      Equipment Currently  Used at Home blood pressure machine;glucometer (P)      Patient currently being followed by outpatient case management? No (P)      Do you currently have service(s) that help you manage your care at home? No (P)      Do you have any problems affording any of your prescribed medications? No (P)      Is the patient taking medications as prescribed? yes (P)      Who is going to help you get home at discharge? family/friends (P)      How do you get to doctors appointments? car, drives self (P)      Are you on dialysis? No (P)      Do you take coumadin? No (P)      DME Needed Upon Discharge  none (P)      Discharge Plan discussed with: Patient (P)      Transition of Care Barriers None (P)      Discharge Plan A Home (P)      Discharge Plan B Home (P)         OTHER    Name(s) of People in Home spouse Meena (P)                    Melissa Flores, MARILYNN, MSW, LMSW, RSW   Case Management  [facility]  Email: [email or web address]

## 2023-09-30 NOTE — HOSPITAL COURSE
Patient placed in observation for chest pain. Troponin negative x3. EKG without ST changes. Defer device irrigation as it was irrigated two days prior. Echo scheduled with cardiology next week. Chest pain seems more consistent with reflux as it improved completely after GI cocktail. Will discharge with close PCP and cardiology follow-up. Advised patient to take Pepcid for one week and assess for further episodes of chest pain. Patient stable for discharge. Extensive return precautions given and patient verbalized understanding. All questions answered.

## 2023-09-30 NOTE — DISCHARGE INSTRUCTIONS
You were no emergent cause of your episode was found today.  You were offered observation but elected to pursue outpatient follow-up.  Be sure to follow-up with your primary care physician.  Return to the ER for any worsening symptoms.  Your potassium is quite low.  Take the replacement as prescribed.

## 2023-10-02 NOTE — DISCHARGE SUMMARY
[facility] Dept  Hospital Medicine  Discharge Summary      Patient Name: Ramesh Acosta  MRN: [id number]  ELIE: [id number]  Patient Class: OP- Observation  Admission Date: 9/30/2023  Hospital Length of Stay: 0 days  Discharge Date and Time: 9/30/2023 11:32 AM  Attending Physician: No att. providers found   Discharging Provider: Anila Kumari PA-C  Primary Care Provider: Baldemar Savage [facility] Medicine Team: [facility] F Anila Kumari PA-C  Primary Care Team: [facility]    HPI:   68-year-old male with past medical history of nonischemic cardiomyopathy secondary to alcohol abuse recent EF of 40%, hypertension, sick sinus syndrome status post pacemaker which was upgraded to ICD after he was diagnosed with heart failure presents for chest tightness.  Patient was when emergency room yesterday for syncopal event.  His device was interrogated and was negative for arrhythmic events.  Patient after going home had a lemonade and was relaxing and started to feel chest tightness which lasted for 2 hours and improved only after receiving GI cocktail.  EKG no ST T wave changes.  Patient denies having any shortness of breath diaphoresis associated with chest tightness.  He had a PET stress that in 2020 that was negative for ischemia.  Patient also denies having any orthopnea PND bilateral lower edema.  Patient also complains of having diarrhea since yesterday      * No surgery found *      Hospital Course:   Patient placed in observation for chest pain. Troponin negative x3. EKG without ST changes. Defer device irrigation as it was irrigated two days prior. Echo scheduled with cardiology next week. Chest pain seems more consistent with reflux as it improved completely after GI cocktail. Will discharge with close PCP and cardiology follow-up. Advised patient to take Pepcid for one week and assess for further episodes of chest pain. Patient stable for discharge. Extensive return precautions  given and patient verbalized understanding. All questions answered.       Goals of Care Treatment Preferences:  Code Status: Full Code      Consults:     Cardiac/Vascular  * Chest tightness  Presents with chest tightness lasted for 2 hours which improved with GI cocktail.  EKG no ST T wave abnormalities and troponins not elevated.  Likely his chest tightness secondary to GI etiology.  Had previous stress test that were negative for ischemia  Troponins negative x3  Echo scheduled with cardiology next week  Return precautions given and patient verbalized understanding    Nonischemic cardiomyopathy  Currently appears compensated.  Continue carvedilol, Entresto, spironolactone.  Will hold Jardiance given that he is having diarrhea    Endocrine  Type 2 diabetes mellitus without complication, with long-term current use of insulin  Sliding scale and a.c. HS      Final Active Diagnoses:    Diagnosis Date Noted POA    PRINCIPAL PROBLEM:  Chest tightness [R07.89] 09/30/2023 Yes    Type 2 diabetes mellitus without complication, with long-term current use of insulin [E11.9, Z79.4] 09/12/2023 Not Applicable    Nonischemic cardiomyopathy [I42.8] 09/12/2023 Yes      Problems Resolved During this Admission:       Discharged Condition: good    Disposition: Home or Self Care    Follow Up:    Patient Instructions:      Diet diabetic     Notify your health care provider if you experience any of the following:  severe uncontrolled pain     Notify your health care provider if you experience any of the following:  difficulty breathing or increased cough     Notify your health care provider if you experience any of the following:  persistent dizziness, light-headedness, or visual disturbances     Activity as tolerated       Significant Diagnostic Studies: Labs:   Troponin   Recent Labs   Lab 09/30/23  0215 09/30/23  0437 09/30/23  1006   TROPONINI <0.006 <0.006  <0.006 0.012       Pending Diagnostic Studies:     None          Medications:  Reconciled Home Medications:      Medication List      CONTINUE taking these medications    carvediloL 12.5 MG tablet  Commonly known as: COREG  Take 12.5 mg by mouth 2 (two) times daily with meals.     fluticasone propionate 50 mcg/actuation nasal spray  Commonly known as: FLONASE  1 spray by Each Nostril route once daily.     furosemide 20 MG tablet  Commonly known as: LASIX  Take 20 mg by mouth once daily.     hydrOXYzine HCL 25 MG tablet  Commonly known as: ATARAX  Take 25 mg by mouth nightly.     insulin glargine 100 unit/mL injection  Commonly known as: Lantus  Inject 10 Units into the skin every evening.     levocetirizine 5 MG tablet  Commonly known as: XYZAL  Take 5 mg by mouth every evening.     rosuvastatin 5 MG tablet  Commonly known as: CRESTOR  Take 5 mg by mouth once daily.     spironolactone 25 MG tablet  Commonly known as: ALDACTONE  Take 12.5 mg by mouth once daily.        ASK your doctor about these medications    ENTRESTO  mg per tablet  Generic drug: sacubitriL-valsartan  Take 1 tablet by mouth 2 (two) times daily.     JARDIANCE 10 mg tablet  Generic drug: empagliflozin  Take 10 mg by mouth once daily.            Indwelling Lines/Drains at time of discharge:   Lines/Drains/Airways     None                 Time spent on the discharge of patient: 35 minutes         Anila Kumari PA-C  Department of Hospital Medicine  [facility] - Emergency Dept

## 2023-10-02 NOTE — ASSESSMENT & PLAN NOTE
Presents with chest tightness lasted for 2 hours which improved with GI cocktail.  EKG no ST T wave abnormalities and troponins not elevated.  Likely his chest tightness secondary to GI etiology.  Had previous stress test that were negative for ischemia  Troponins negative x3  Echo scheduled with cardiology next week  Return precautions given and patient verbalized understanding

## 2023-10-09 ENCOUNTER — HOSPITAL ENCOUNTER (OUTPATIENT)
Dept: CARDIOLOGY | Facility: HOSPITAL | Age: 68
Discharge: HOME | End: 2023-10-09
Attending: INTERNAL MEDICINE
Payer: MEDICARE

## 2023-10-09 VITALS
HEIGHT: 66 IN | HEART RATE: 72 BPM | BODY MASS INDEX: 33.11 KG/M2 | SYSTOLIC BLOOD PRESSURE: 130 MMHG | DIASTOLIC BLOOD PRESSURE: 80 MMHG | WEIGHT: 206 LBS

## 2023-10-09 DIAGNOSIS — I42.8 NONISCHEMIC CARDIOMYOPATHY: ICD-10-CM

## 2023-10-09 LAB
ASCENDING AORTA: 3.39 CM
AV INDEX (PROSTH): 0.67
AV MEAN GRADIENT: 2 MMHG
AV PEAK GRADIENT: 3 MMHG
AV VALVE AREA BY VELOCITY RATIO: 2.05 CM²
AV VALVE AREA: 2.12 CM²
AV VELOCITY RATIO: 0.65
BSA FOR ECHO PROCEDURE: 2.09 M2
CV ECHO LV RWT: 0.41 CM
DOP CALC AO PEAK VEL: 0.93 M/S
DOP CALC AO VTI: 16.57 CM
DOP CALC LVOT AREA: 3.2 CM2
DOP CALC LVOT DIAMETER: 2.01 CM
DOP CALC LVOT PEAK VEL: 0.6 M/S
DOP CALC LVOT STROKE VOLUME: 35.17 CM3
DOP CALCLVOT PEAK VEL VTI: 11.09 CM
E/E' RATIO: 4.8 M/S
ECHO LV POSTERIOR WALL: 0.87 CM (ref 0.6–1.1)
FRACTIONAL SHORTENING: 20 % (ref 28–44)
INTERVENTRICULAR SEPTUM: 0.66 CM (ref 0.6–1.1)
LA MAJOR: 4.96 CM
LA MINOR: 4.33 CM
LA WIDTH: 2.52 CM
LEFT ATRIUM SIZE: 3.56 CM
LEFT ATRIUM VOLUME INDEX MOD: 14 ML/M2
LEFT ATRIUM VOLUME INDEX: 17.4 ML/M2
LEFT ATRIUM VOLUME MOD: 28.38 CM3
LEFT ATRIUM VOLUME: 35.26 CM3
LEFT INTERNAL DIMENSION IN SYSTOLE: 3.41 CM (ref 2.1–4)
LEFT VENTRICLE DIASTOLIC VOLUME INDEX: 40.01 ML/M2
LEFT VENTRICLE DIASTOLIC VOLUME: 81.23 ML
LEFT VENTRICLE MASS INDEX: 48 G/M2
LEFT VENTRICLE SYSTOLIC VOLUME INDEX: 23.5 ML/M2
LEFT VENTRICLE SYSTOLIC VOLUME: 47.62 ML
LEFT VENTRICULAR INTERNAL DIMENSION IN DIASTOLE: 4.26 CM (ref 3.5–6)
LEFT VENTRICULAR MASS: 97.77 G
LV LATERAL E/E' RATIO: 4.8 M/S
LV SEPTAL E/E' RATIO: 4.8 M/S
MV PEAK E VEL: 0.48 M/S
PISA TR MAX VEL: 1.93 M/S
RA MAJOR: 5.73 CM
RA PRESSURE ESTIMATED: 15 MMHG
RA WIDTH: 5.13 CM
RIGHT VENTRICULAR END-DIASTOLIC DIMENSION: 5.5 CM
RV TB RVSP: 17 MMHG
SINUS: 3.2 CM
STJ: 2.92 CM
TDI LATERAL: 0.1 M/S
TDI SEPTAL: 0.1 M/S
TDI: 0.1 M/S
TR MAX PG: 15 MMHG
TRICUSPID ANNULAR PLANE SYSTOLIC EXCURSION: 1.84 CM
TV REST PULMONARY ARTERY PRESSURE: 30 MMHG
Z-SCORE OF LEFT VENTRICULAR DIMENSION IN END DIASTOLE: -3.45
Z-SCORE OF LEFT VENTRICULAR DIMENSION IN END SYSTOLE: -0.62

## 2023-10-09 PROCEDURE — 93306 ECHO (CUPID ONLY): ICD-10-PCS | Mod: 26,,, | Performed by: INTERNAL MEDICINE

## 2023-10-09 PROCEDURE — C8929 TTE W OR WO FOL WCON,DOPPLER: HCPCS

## 2023-10-09 PROCEDURE — 93306 TTE W/DOPPLER COMPLETE: CPT | Mod: 26,,, | Performed by: INTERNAL MEDICINE

## 2023-10-09 PROCEDURE — 25500020 PHARM REV CODE 255: Performed by: INTERNAL MEDICINE

## 2023-10-09 RX ADMIN — HUMAN ALBUMIN MICROSPHERES AND PERFLUTREN 0.66 MG: 10; .22 INJECTION, SOLUTION INTRAVENOUS at 02:10

## 2023-10-09 NOTE — NURSING
Patient identified by 2 identifiers. Allergies reviewed, procedure explained and pt verbalized understanding.  22 g IV placed to Lt AC, flushed w/ 10cc NS pre & post contrast administration.  3cc Optison administered by sonographer, echo images obtained.  Pt tolerated procedure well.  IV D/C'ed, preasure dsg applied.  Pt D/C'ed to home.

## 2023-10-10 ENCOUNTER — PATIENT MESSAGE (OUTPATIENT)
Dept: CARDIOLOGY | Facility: CLINIC | Age: 68
End: 2023-10-10
Payer: COMMERCIAL

## 2023-10-10 DIAGNOSIS — I51.9 SYSTOLIC DYSFUNCTION: Primary | ICD-10-CM

## 2023-10-10 RX ORDER — CARVEDILOL 12.5 MG/1
25 TABLET ORAL 2 TIMES DAILY WITH MEALS
Qty: 180 TABLET | Refills: 3 | Status: SHIPPED | OUTPATIENT
Start: 2023-10-10

## 2023-10-12 ENCOUNTER — PATIENT MESSAGE (OUTPATIENT)
Dept: CARDIOLOGY | Facility: CLINIC | Age: 68
End: 2023-10-12
Payer: COMMERCIAL

## 2023-10-12 ENCOUNTER — PATIENT MESSAGE (OUTPATIENT)
Dept: INTERNAL MEDICINE | Facility: CLINIC | Age: 68
End: 2023-10-12
Payer: COMMERCIAL

## 2023-10-12 DIAGNOSIS — Z79.4 TYPE 2 DIABETES MELLITUS WITHOUT COMPLICATION, WITH LONG-TERM CURRENT USE OF INSULIN: ICD-10-CM

## 2023-10-12 DIAGNOSIS — E11.9 TYPE 2 DIABETES MELLITUS WITHOUT COMPLICATION, WITH LONG-TERM CURRENT USE OF INSULIN: ICD-10-CM

## 2023-10-12 RX ORDER — HYDROXYZINE HYDROCHLORIDE 25 MG/1
25 TABLET, FILM COATED ORAL NIGHTLY
Qty: 20 TABLET | Refills: 0 | Status: SHIPPED | OUTPATIENT
Start: 2023-10-12 | End: 2023-12-19 | Stop reason: SDUPTHER

## 2023-10-12 RX ORDER — INSULIN GLARGINE 100 [IU]/ML
10 INJECTION, SOLUTION SUBCUTANEOUS NIGHTLY
Qty: 10 ML | Refills: 3 | Status: SHIPPED | OUTPATIENT
Start: 2023-10-12 | End: 2023-10-27

## 2023-10-12 RX ORDER — FUROSEMIDE 20 MG/1
20 TABLET ORAL DAILY
Qty: 90 TABLET | Refills: 3 | Status: SHIPPED | OUTPATIENT
Start: 2023-10-12

## 2023-10-12 RX ORDER — SACUBITRIL AND VALSARTAN 97; 103 MG/1; MG/1
1 TABLET, FILM COATED ORAL 2 TIMES DAILY
Qty: 60 TABLET | Refills: 6 | Status: SHIPPED | OUTPATIENT
Start: 2023-10-12

## 2023-10-12 RX ORDER — ROSUVASTATIN CALCIUM 5 MG/1
5 TABLET, COATED ORAL DAILY
Qty: 30 TABLET | Refills: 0 | Status: SHIPPED | OUTPATIENT
Start: 2023-10-12

## 2023-10-12 NOTE — TELEPHONE ENCOUNTER
No care due was identified.  [facility] Embedded Care Due Messages. Reference number: [id number].   10/12/2023 4:21:13 PM CDT

## 2023-10-24 ENCOUNTER — PATIENT MESSAGE (OUTPATIENT)
Dept: INTERNAL MEDICINE | Facility: CLINIC | Age: 68
End: 2023-10-24
Payer: COMMERCIAL

## 2023-10-24 DIAGNOSIS — E11.9 TYPE 2 DIABETES MELLITUS WITHOUT COMPLICATION, WITH LONG-TERM CURRENT USE OF INSULIN: ICD-10-CM

## 2023-10-24 DIAGNOSIS — Z79.4 TYPE 2 DIABETES MELLITUS WITHOUT COMPLICATION, WITH LONG-TERM CURRENT USE OF INSULIN: ICD-10-CM

## 2023-10-24 NOTE — TELEPHONE ENCOUNTER
No care due was identified.  [facility] Embedded Care Due Messages. Reference number: [id number].   10/24/2023 4:02:25 PM CDT

## 2023-10-24 NOTE — TELEPHONE ENCOUNTER
pt requested 90 day refills for best prices for diabetes meds Basaglar 100 Unit/ML Kwikpen insulin pen  BD UF Mini Pen needle  [id number]

## 2023-10-26 RX ORDER — INSULIN GLARGINE 100 [IU]/ML
10 INJECTION, SOLUTION SUBCUTANEOUS NIGHTLY
Qty: 9 ML | Refills: 3 | Status: SHIPPED | OUTPATIENT
Start: 2023-10-26 | End: 2023-10-27

## 2023-10-26 RX ORDER — INSULIN DEGLUDEC 100 U/ML
INJECTION, SOLUTION SUBCUTANEOUS
Refills: 0 | OUTPATIENT
Start: 2023-10-26

## 2023-10-26 NOTE — TELEPHONE ENCOUNTER
No care due was identified.  [facility] Embedded Care Due Messages. Reference number: [id number].   10/26/2023 9:24:34 AM CDT

## 2023-10-27 RX ORDER — INSULIN DETEMIR 100 [IU]/ML
10 INJECTION, SOLUTION SUBCUTANEOUS NIGHTLY
Qty: 9 ML | Refills: 3 | Status: SHIPPED | OUTPATIENT
Start: 2023-10-27 | End: 2024-03-06

## 2023-11-10 ENCOUNTER — PATIENT MESSAGE (OUTPATIENT)
Dept: DERMATOLOGY | Facility: CLINIC | Age: 68
End: 2023-11-10
Payer: COMMERCIAL

## 2023-11-10 ENCOUNTER — TELEPHONE (OUTPATIENT)
Dept: DERMATOLOGY | Facility: CLINIC | Age: 68
End: 2023-11-10
Payer: COMMERCIAL

## 2023-11-14 ENCOUNTER — OFFICE VISIT (OUTPATIENT)
Dept: TRANSPLANT | Facility: CLINIC | Age: 68
End: 2023-11-14
Payer: MEDICARE

## 2023-11-14 ENCOUNTER — LAB VISIT (OUTPATIENT)
Dept: LAB | Facility: HOSPITAL | Age: 68
End: 2023-11-14
Attending: INTERNAL MEDICINE
Payer: MEDICARE

## 2023-11-14 VITALS
HEART RATE: 82 BPM | SYSTOLIC BLOOD PRESSURE: 112 MMHG | WEIGHT: 202.63 LBS | BODY MASS INDEX: 32.56 KG/M2 | DIASTOLIC BLOOD PRESSURE: 80 MMHG | OXYGEN SATURATION: 82 % | HEIGHT: 66 IN

## 2023-11-14 DIAGNOSIS — I42.8 NONISCHEMIC CARDIOMYOPATHY: ICD-10-CM

## 2023-11-14 DIAGNOSIS — I50.9 CONGESTIVE HEART FAILURE, UNSPECIFIED HF CHRONICITY, UNSPECIFIED HEART FAILURE TYPE: ICD-10-CM

## 2023-11-14 DIAGNOSIS — I10 PRIMARY HYPERTENSION: ICD-10-CM

## 2023-11-14 DIAGNOSIS — Z79.4 TYPE 2 DIABETES MELLITUS WITHOUT COMPLICATION, WITH LONG-TERM CURRENT USE OF INSULIN: ICD-10-CM

## 2023-11-14 DIAGNOSIS — I50.42 CHRONIC COMBINED SYSTOLIC AND DIASTOLIC HEART FAILURE: Primary | ICD-10-CM

## 2023-11-14 DIAGNOSIS — Z95.810 ICD (IMPLANTABLE CARDIOVERTER-DEFIBRILLATOR) IN PLACE: ICD-10-CM

## 2023-11-14 DIAGNOSIS — E11.9 TYPE 2 DIABETES MELLITUS WITHOUT COMPLICATION, WITH LONG-TERM CURRENT USE OF INSULIN: ICD-10-CM

## 2023-11-14 LAB
ALBUMIN SERPL BCP-MCNC: 3.5 G/DL (ref 3.5–5.2)
ALP SERPL-CCNC: 71 U/L (ref 55–135)
ALT SERPL W/O P-5'-P-CCNC: 11 U/L (ref 10–44)
ANION GAP SERPL CALC-SCNC: 10 MMOL/L (ref 8–16)
AST SERPL-CCNC: 10 U/L (ref 10–40)
BILIRUB SERPL-MCNC: 1.8 MG/DL (ref 0.1–1)
BNP SERPL-MCNC: 340 PG/ML (ref 0–99)
BUN SERPL-MCNC: 14 MG/DL (ref 8–23)
CALCIUM SERPL-MCNC: 8.9 MG/DL (ref 8.7–10.5)
CHLORIDE SERPL-SCNC: 105 MMOL/L (ref 95–110)
CO2 SERPL-SCNC: 29 MMOL/L (ref 23–29)
CREAT SERPL-MCNC: 1 MG/DL (ref 0.5–1.4)
EST. GFR  (NO RACE VARIABLE): >60 ML/MIN/1.73 M^2
GLUCOSE SERPL-MCNC: 107 MG/DL (ref 70–110)
MAGNESIUM SERPL-MCNC: 1.9 MG/DL (ref 1.6–2.6)
POTASSIUM SERPL-SCNC: 3.4 MMOL/L (ref 3.5–5.1)
PROT SERPL-MCNC: 6.4 G/DL (ref 6–8.4)
SODIUM SERPL-SCNC: 144 MMOL/L (ref 136–145)
TSH SERPL DL<=0.005 MIU/L-ACNC: 2.57 UIU/ML (ref 0.4–4)

## 2023-11-14 PROCEDURE — 84443 ASSAY THYROID STIM HORMONE: CPT | Performed by: INTERNAL MEDICINE

## 2023-11-14 PROCEDURE — 83880 ASSAY OF NATRIURETIC PEPTIDE: CPT | Performed by: INTERNAL MEDICINE

## 2023-11-14 PROCEDURE — 99205 OFFICE O/P NEW HI 60 MIN: CPT | Mod: S$PBB,,, | Performed by: INTERNAL MEDICINE

## 2023-11-14 PROCEDURE — 83735 ASSAY OF MAGNESIUM: CPT | Performed by: INTERNAL MEDICINE

## 2023-11-14 PROCEDURE — 99205 PR OFFICE/OUTPT VISIT, NEW, LEVL V, 60-74 MIN: ICD-10-PCS | Mod: S$PBB,,, | Performed by: INTERNAL MEDICINE

## 2023-11-14 PROCEDURE — 36415 COLL VENOUS BLD VENIPUNCTURE: CPT | Performed by: INTERNAL MEDICINE

## 2023-11-14 PROCEDURE — 80053 COMPREHEN METABOLIC PANEL: CPT | Performed by: INTERNAL MEDICINE

## 2023-11-14 PROCEDURE — 99999 PR PBB SHADOW E&M-EST. PATIENT-LVL IV: ICD-10-PCS | Mod: PBBFAC,,, | Performed by: INTERNAL MEDICINE

## 2023-11-14 PROCEDURE — 99214 OFFICE O/P EST MOD 30 MIN: CPT | Mod: PBBFAC | Performed by: INTERNAL MEDICINE

## 2023-11-14 PROCEDURE — 83880 ASSAY OF NATRIURETIC PEPTIDE: CPT | Mod: 91 | Performed by: INTERNAL MEDICINE

## 2023-11-14 PROCEDURE — 99999 PR PBB SHADOW E&M-EST. PATIENT-LVL IV: CPT | Mod: PBBFAC,,, | Performed by: INTERNAL MEDICINE

## 2023-11-14 RX ORDER — SPIRONOLACTONE 25 MG/1
25 TABLET ORAL DAILY
Qty: 90 TABLET | Refills: 3 | Status: SHIPPED | OUTPATIENT
Start: 2023-11-14

## 2023-11-14 RX ORDER — COLCHICINE 0.6 MG/1
0.6 TABLET ORAL 2 TIMES DAILY
COMMUNITY
Start: 2023-05-23

## 2023-11-14 RX ORDER — HYDROCORTISONE 25 MG/G
20 OINTMENT TOPICAL 2 TIMES DAILY
COMMUNITY
Start: 2023-05-19

## 2023-11-14 NOTE — PROGRESS NOTES
"Subjective:       HPI:  Mr. Acosta is leasant 68 year-old man with sick sinus syndrome s/p PPM, nonischemic CMP with PPM upgraded to ICD in 2021, hypertension, and diabetes mellitus who is referred by our colleague Dr. Ajay Azul for evaluation and management of his CHF. Clinically reports NYHA class II symptoms. NO PND or orthopnea. No recent HF admissions. Of note his LV function had improved to 40% however on recent echo his LV EF dropped to 23-30%. His current HF regimen includes; Entresto 97/103 mg BID, carvedilol 25 mg bID (recently increased from 12.5 mg), spironolactone 12.5 mg daily and Jardiance 10 mg daily. He does report missing few doses on his carvedilol in the recent past.     Past Medical History:   Diagnosis Date    Chronic combined systolic and diastolic heart failure 11/14/2023     No past surgical history on file.    Review of Systems   Constitutional: Negative. Negative for chills, decreased appetite, diaphoresis, fever, malaise/fatigue, night sweats, weight gain and weight loss.   Eyes: Negative.    Cardiovascular:  Negative for chest pain, claudication, cyanosis, dyspnea on exertion, irregular heartbeat, leg swelling, near-syncope, orthopnea, palpitations, paroxysmal nocturnal dyspnea and syncope.   Respiratory:  Negative for cough, hemoptysis and shortness of breath.    Endocrine: Negative.    Hematologic/Lymphatic: Negative.    Skin:  Negative for color change, dry skin and nail changes.   Musculoskeletal: Negative.    Gastrointestinal: Negative.    Genitourinary: Negative.    Neurological:  Negative for weakness.       Objective:   Blood pressure 112/80, pulse 82, height 5' 6" (1.676 m), weight 91.9 kg (202 lb 9.6 oz), SpO2 (!) 82 %.body mass index is 32.7 kg/m².  Physical Exam  Vitals reviewed.   Constitutional:       Appearance: He is well-developed.      Comments: /80 (BP Location: Right arm, Patient Position: Sitting, BP Method: Small (Automatic))   Pulse 82   Ht 5' 6" (1.676 " "m)   Wt 91.9 kg (202 lb 9.6 oz)   SpO2 (!) 82%   BMI 32.70 kg/m²      HENT:      Head: Normocephalic.   Neck:      Vascular: No carotid bruit or JVD.   Cardiovascular:      Rate and Rhythm: Regular rhythm.      Chest Wall: PMI is displaced.      Pulses: Normal pulses.      Heart sounds: Normal heart sounds. No murmur heard.  Pulmonary:      Effort: Pulmonary effort is normal.      Breath sounds: Normal breath sounds.   Abdominal:      General: Bowel sounds are normal.      Palpations: Abdomen is soft.   Skin:     General: Skin is warm.   Neurological:      Mental Status: He is alert.       Labs:    Chemistry        Component Value Date/Time     09/30/2023 0323    K 4.3 09/30/2023 0323     09/30/2023 0323    CO2 23 09/30/2023 0323    BUN 16 09/30/2023 0323    CREATININE 0.9 09/30/2023 0323    GLU 98 09/30/2023 0323        Component Value Date/Time    CALCIUM 8.4 (L) 09/30/2023 0323    ALKPHOS 69 09/30/2023 0323    AST 27 09/30/2023 0323    ALT 16 09/30/2023 0323    BILITOT 1.5 (H) 09/30/2023 0323          Magnesium   Date Value Ref Range Status   09/29/2023 2.0 1.6 - 2.6 mg/dL Final     Lab Results   Component Value Date    WBC 7.77 09/30/2023    HGB 15.5 09/30/2023    HCT 45.2 09/30/2023     09/30/2023     No results found for: "INR", "PROTIME"  BNP   Date Value Ref Range Status   09/30/2023 87 0 - 99 pg/mL Final     Comment:     Values of less than 100 pg/ml are consistent with non-CHF populations.   09/29/2023 108 (H) 0 - 99 pg/mL Final     Comment:     Values of less than 100 pg/ml are consistent with non-CHF populations.     No results found for: "LDH"  No results found for this or any previous visit.    No results found for this or any previous visit.      Assessment:      1. Chronic combined systolic and diastolic heart failure    2. Nonischemic cardiomyopathy    3. ICD (implantable cardioverter-defibrillator) in place    4. Primary hypertension    5. Type 2 diabetes mellitus without " complication, with long-term current use of insulin        Plan:   Stage C HFrEF. NYHA class II symptoms. Euvolemic on exam.   Increase spironolactone to 25 mg daily with labs next week.  Continue full dose of Entresto, carvedilol and jardiance.  Recommend 2 gram sodium restriction and 1500cc fluid restriction.  Encourage physical activity with graded exercise program.  Requested patient to weigh themselves daily, and to notify us if their weight increases by more than 3 lbs in 1 day or 5 lbs in 1 week.   RTC in 6 months     Katya Lai MD

## 2023-11-15 LAB — NT-PROBNP SERPL IA-MCNC: 421 PG/ML

## 2023-11-20 ENCOUNTER — OFFICE VISIT (OUTPATIENT)
Dept: UROLOGY | Facility: CLINIC | Age: 68
End: 2023-11-20
Payer: MEDICARE

## 2023-11-20 VITALS
DIASTOLIC BLOOD PRESSURE: 90 MMHG | BODY MASS INDEX: 31.5 KG/M2 | HEIGHT: 66 IN | SYSTOLIC BLOOD PRESSURE: 117 MMHG | WEIGHT: 196 LBS | HEART RATE: 88 BPM

## 2023-11-20 DIAGNOSIS — N52.01 ERECTILE DYSFUNCTION DUE TO ARTERIAL INSUFFICIENCY: Primary | ICD-10-CM

## 2023-11-20 DIAGNOSIS — E11.8 DM TYPE 2 CAUSING COMPLICATION: ICD-10-CM

## 2023-11-20 DIAGNOSIS — I10 PRIMARY HYPERTENSION: ICD-10-CM

## 2023-11-20 PROBLEM — R07.89 CHEST TIGHTNESS: Status: RESOLVED | Noted: 2023-09-30 | Resolved: 2023-11-20

## 2023-11-20 PROCEDURE — 99204 PR OFFICE/OUTPT VISIT, NEW, LEVL IV, 45-59 MIN: ICD-10-PCS | Mod: S$PBB,,, | Performed by: UROLOGY

## 2023-11-20 PROCEDURE — 99999 PR PBB SHADOW E&M-EST. PATIENT-LVL IV: CPT | Mod: PBBFAC,,, | Performed by: UROLOGY

## 2023-11-20 PROCEDURE — 99204 OFFICE O/P NEW MOD 45 MIN: CPT | Mod: S$PBB,,, | Performed by: UROLOGY

## 2023-11-20 PROCEDURE — 99999 PR PBB SHADOW E&M-EST. PATIENT-LVL IV: ICD-10-PCS | Mod: PBBFAC,,, | Performed by: UROLOGY

## 2023-11-20 PROCEDURE — 99214 OFFICE O/P EST MOD 30 MIN: CPT | Mod: PBBFAC | Performed by: UROLOGY

## 2023-11-20 RX ORDER — TADALAFIL 20 MG/1
20 TABLET ORAL DAILY
Qty: 30 TABLET | Refills: 11 | Status: SHIPPED | OUTPATIENT
Start: 2023-11-20 | End: 2023-11-20 | Stop reason: SDUPTHER

## 2023-11-20 RX ORDER — TADALAFIL 20 MG/1
20 TABLET ORAL DAILY
Qty: 30 TABLET | Refills: 11 | Status: SHIPPED | OUTPATIENT
Start: 2023-11-20 | End: 2024-11-19

## 2023-11-20 NOTE — PROGRESS NOTES
CHIEF COMPLAINT:    Mr. Acosta is a 68 y.o. male presenting with erectile dysfunction.    PRESENTING ILLNESS:    Ramesh Acosta is a 68 y.o. male who c/o ED.  He's tried Viagra in the past with good results.       No LUTS. No nocturia. Pleased with how he voids.    REVIEW OF SYSTEMS:    Ramesh Acosta denies headache, blurred vision, fever, nausea, vomiting, chills, abdominal pain, chest pain, sore throat, bleeding per rectum, cough, SOB, recent loss of consciousness, recent mental status changes, seizures, dizziness, or upper or lower extremity weakness.    GERRY  1. 2  2. 3  3. 2  4. 2  5. 4      PATIENT HISTORY:    Past Medical History:   Diagnosis Date    Chronic combined systolic and diastolic heart failure 11/14/2023    Diabetes mellitus     Gout, unspecified     Heart disease     High cholesterol     Insomnia     Systolic dysfunction        History reviewed. No pertinent surgical history.    Family History   Problem Relation Age of Onset    No Known Problems Father     Breast cancer Mother        Social History     Socioeconomic History    Marital status:    Tobacco Use    Smoking status: Never     Passive exposure: Never    Smokeless tobacco: Never   Substance and Sexual Activity    Alcohol use: Not Currently    Drug use: Never    Sexual activity: Yes     Partners: Female       Allergies:  Patient has no known allergies.    Medications:    Current Outpatient Medications:     carvediloL (COREG) 12.5 MG tablet, Take 2 tablets (25 mg total) by mouth 2 (two) times daily with meals., Disp: 180 tablet, Rfl: 3    colchicine, gout, (COLCRYS) 0.6 mg tablet, Take 0.6 mg by mouth 2 (two) times daily., Disp: , Rfl:     empagliflozin (JARDIANCE) 10 mg tablet, Take 1 tablet (10 mg total) by mouth once daily., Disp: 30 tablet, Rfl: 6    fluticasone propionate (FLONASE) 50 mcg/actuation nasal spray, 1 spray by Each Nostril route once daily., Disp: , Rfl:     furosemide (LASIX) 20 MG tablet, Take 1  Pt transported to ct per ambulation.  Pt placed in prone position on ct table.
Monitors applied to pt.  O2 on at 2l/nc. tablet (20 mg total) by mouth once daily., Disp: 90 tablet, Rfl: 3    hydrocortisone 2.5 % ointment, Apply 20 g topically 2 (two) times daily., Disp: , Rfl:     hydrOXYzine HCL (ATARAX) 25 MG tablet, Take 1 tablet (25 mg total) by mouth nightly., Disp: 20 tablet, Rfl: 0    insulin detemir U-100, Levemir, (LEVEMIR FLEXPEN) 100 unit/mL (3 mL) InPn pen, Inject 10 Units into the skin every evening., Disp: 9 mL, Rfl: 3    levocetirizine (XYZAL) 5 MG tablet, Take 5 mg by mouth every evening., Disp: , Rfl:     rosuvastatin (CRESTOR) 5 MG tablet, Take 1 tablet (5 mg total) by mouth once daily., Disp: 30 tablet, Rfl: 0    sacubitriL-valsartan (ENTRESTO)  mg per tablet, Take 1 tablet by mouth 2 (two) times daily., Disp: 60 tablet, Rfl: 6    spironolactone (ALDACTONE) 25 MG tablet, Take 1 tablet (25 mg total) by mouth once daily., Disp: 90 tablet, Rfl: 3    PHYSICAL EXAMINATION:    The patient generally appears in good health, is appropriately interactive, and is in no apparent distress.     Eyes: anicteric sclerae, moist conjunctivae; no lid-lag; PERRLA     HENT: Atraumatic; oropharynx clear with moist mucous membranes and no mucosal ulcerations;normal hard and soft palate.  No evidence of lymphadenopathy.    Neck: Trachea midline.  No thyromegaly.    Skin: No lesions.    Mental: Cooperative with normal affect.  Is oriented to time, place, and person.    Neuro: Grossly intact.    Chest: Normal inspiratory effort.   No accessory muscles.  No audible wheezes.  Respirations symmetric on inspiration and expiration.    Heart: Regular rhythm.      Abdomen:  Soft, non-tender. No masses or organomegaly. Bladder is not palpable. No evidence of flank discomfort. No evidence of inguinal hernia.    Genitourinary: The penis is not circumcised with no evidence of plaques or induration. The urethral meatus is normal. The testes, epididymides, and cord structures are normal in size and contour bilaterally. The scrotum is normal in size  and contour.    Normal anal sphincter tone. No rectal mass.    The prostate is 40g. Normal landmarks. Lateral sulci. Median furrow intact.  No nodularity or induration. Seminal vesicles are normal.    Extremities: No clubbing, cyanosis, or edema      LABS:    Lab Results   Component Value Date    PSA 1.8 09/26/2023       IMPRESSION:    Encounter Diagnoses   Name Primary?    Erectile dysfunction due to arterial insufficiency Yes    Primary hypertension     DM type 2 causing complication     BPH with urinary obstruction    DM,.stable  HTN, stable      PLAN:    1.Discussed options for his ED (affected by above comorbidities).  He'd like Cialis. Side effects discussed.  A new Rx was given  2. RTC 3 months to seen an EUSEBIA.      Copy to:

## 2023-11-21 ENCOUNTER — TELEPHONE (OUTPATIENT)
Dept: TRANSPLANT | Facility: CLINIC | Age: 68
End: 2023-11-21
Payer: COMMERCIAL

## 2023-11-21 ENCOUNTER — LAB VISIT (OUTPATIENT)
Dept: LAB | Facility: OTHER | Age: 68
End: 2023-11-21
Attending: INTERNAL MEDICINE
Payer: COMMERCIAL

## 2023-11-21 DIAGNOSIS — I50.42 CHRONIC COMBINED SYSTOLIC AND DIASTOLIC HEART FAILURE: ICD-10-CM

## 2023-11-21 LAB
ANION GAP SERPL CALC-SCNC: 9 MMOL/L (ref 8–16)
BUN SERPL-MCNC: 15 MG/DL (ref 8–23)
CALCIUM SERPL-MCNC: 9.1 MG/DL (ref 8.7–10.5)
CHLORIDE SERPL-SCNC: 107 MMOL/L (ref 95–110)
CO2 SERPL-SCNC: 26 MMOL/L (ref 23–29)
CREAT SERPL-MCNC: 1 MG/DL (ref 0.5–1.4)
EST. GFR  (NO RACE VARIABLE): >60 ML/MIN/1.73 M^2
GLUCOSE SERPL-MCNC: 115 MG/DL (ref 70–110)
POTASSIUM SERPL-SCNC: 3.6 MMOL/L (ref 3.5–5.1)
SODIUM SERPL-SCNC: 142 MMOL/L (ref 136–145)

## 2023-11-21 PROCEDURE — 36415 COLL VENOUS BLD VENIPUNCTURE: CPT | Performed by: INTERNAL MEDICINE

## 2023-11-21 PROCEDURE — 80048 BASIC METABOLIC PNL TOTAL CA: CPT | Performed by: INTERNAL MEDICINE

## 2023-11-21 NOTE — TELEPHONE ENCOUNTER
5:50 pm : F/U on todays nurse lab phone reminder  See Dr. Espino 11/14/23 w/ indication for this lab  I copied a portion below:  Increase spironolactone to 25 mg (1 tab) daily     Results in epic and are stable  K+ 3.6  Cr 1.0     6:00 pm Called and spoke w/ pt at this time:  Informed pt of good lab results  Pt confirmed he did start taking /increased Spironolactone to a full tablet 25 mg once daily on 11/15    Advised pt to continue this dose   Pt voice understanding and agreement.    death certificate - original listed incorrect county, endorsed to Marc Clifton for Dr. Carlitos Hughes

## 2023-12-12 ENCOUNTER — CLINICAL SUPPORT (OUTPATIENT)
Dept: CARDIOLOGY | Facility: HOSPITAL | Age: 68
End: 2023-12-12
Attending: INTERNAL MEDICINE
Payer: MEDICARE

## 2023-12-12 ENCOUNTER — CLINICAL SUPPORT (OUTPATIENT)
Dept: CARDIOLOGY | Facility: HOSPITAL | Age: 68
End: 2023-12-12
Payer: COMMERCIAL

## 2023-12-12 DIAGNOSIS — I48.0 PAROXYSMAL ATRIAL FIBRILLATION: ICD-10-CM

## 2023-12-12 PROCEDURE — 93296 REM INTERROG EVL PM/IDS: CPT | Performed by: INTERNAL MEDICINE

## 2023-12-12 PROCEDURE — 93295 DEV INTERROG REMOTE 1/2/MLT: CPT | Mod: ,,, | Performed by: INTERNAL MEDICINE

## 2023-12-12 PROCEDURE — 93295 CARDIAC DEVICE CHECK - REMOTE: ICD-10-PCS | Mod: ,,, | Performed by: INTERNAL MEDICINE

## 2023-12-19 RX ORDER — HYDROXYZINE HYDROCHLORIDE 25 MG/1
25 TABLET, FILM COATED ORAL NIGHTLY
Qty: 20 TABLET | Refills: 0 | Status: SHIPPED | OUTPATIENT
Start: 2023-12-19 | End: 2024-02-05

## 2023-12-20 LAB
OHS CV AF BURDEN PERCENT: < 1
OHS CV DC REMOTE DEVICE TYPE: NORMAL
OHS CV RV PACING PERCENT: 0.37 %

## 2024-01-04 DIAGNOSIS — E11.9 TYPE 2 DIABETES MELLITUS WITHOUT COMPLICATION: ICD-10-CM

## 2024-01-25 ENCOUNTER — PATIENT MESSAGE (OUTPATIENT)
Dept: INTERNAL MEDICINE | Facility: CLINIC | Age: 69
End: 2024-01-25

## 2024-01-25 ENCOUNTER — OFFICE VISIT (OUTPATIENT)
Dept: INTERNAL MEDICINE | Facility: CLINIC | Age: 69
End: 2024-01-25
Payer: MEDICARE

## 2024-01-25 VITALS
OXYGEN SATURATION: 97 % | WEIGHT: 196.44 LBS | SYSTOLIC BLOOD PRESSURE: 120 MMHG | HEIGHT: 66 IN | HEART RATE: 70 BPM | DIASTOLIC BLOOD PRESSURE: 84 MMHG | BODY MASS INDEX: 31.57 KG/M2

## 2024-01-25 DIAGNOSIS — R20.2 NUMBNESS AND TINGLING IN LEFT HAND: Primary | ICD-10-CM

## 2024-01-25 DIAGNOSIS — R20.0 NUMBNESS AND TINGLING IN LEFT HAND: Primary | ICD-10-CM

## 2024-01-25 PROCEDURE — 99213 OFFICE O/P EST LOW 20 MIN: CPT | Mod: S$PBB,,,

## 2024-01-25 PROCEDURE — 99214 OFFICE O/P EST MOD 30 MIN: CPT | Mod: PBBFAC

## 2024-01-25 PROCEDURE — 99999 PR PBB SHADOW E&M-EST. PATIENT-LVL IV: CPT | Mod: PBBFAC,,,

## 2024-01-25 NOTE — PROGRESS NOTES
Subjective     Patient ID: Ramesh Acosta is a 69 y.o. male.    Chief Complaint: Numbness    Pt reports weakness in L hand x 1 week. Has not happened before. Denies injury. Numb and tingling, mostly middle finger. Has not taken any medication or tried any treatments.      Review of Systems   Constitutional:  Negative for activity change and unexpected weight change.   HENT:  Negative for hearing loss, rhinorrhea and trouble swallowing.    Eyes:  Negative for discharge and visual disturbance.   Respiratory:  Negative for chest tightness and wheezing.    Cardiovascular:  Negative for chest pain and palpitations.   Gastrointestinal:  Negative for blood in stool, constipation, diarrhea and vomiting.   Endocrine: Negative for polydipsia and polyuria.   Genitourinary:  Negative for difficulty urinating, hematuria and urgency.   Musculoskeletal:  Negative for arthralgias, joint swelling and neck pain.   Neurological:  Negative for weakness and headaches.   Psychiatric/Behavioral:  Negative for confusion and dysphoric mood.           Objective     Physical Exam  Vitals reviewed.   Constitutional:       Appearance: He is well-developed.   HENT:      Head: Normocephalic and atraumatic.   Eyes:      Conjunctiva/sclera: Conjunctivae normal.   Cardiovascular:      Rate and Rhythm: Normal rate.   Pulmonary:      Effort: Pulmonary effort is normal. No respiratory distress.   Skin:     General: Skin is warm and dry.      Findings: No rash.   Neurological:      Mental Status: He is alert and oriented to person, place, and time.      Coordination: Coordination normal.   Psychiatric:         Behavior: Behavior normal.            Assessment and Plan     1. Numbness and tingling in left hand    2+ radial pulse, cap refill <3 sec. Pain does not radiate. No associated CP, SOB. Pt with significant cardiac history. Unable to take NSAID. Declines PT at this time. Will f/u through portal in 2 weeks to evaluate healing process and  readdress PT.             No follow-ups on file.

## 2024-01-30 ENCOUNTER — OFFICE VISIT (OUTPATIENT)
Dept: NEPHROLOGY | Facility: CLINIC | Age: 69
End: 2024-01-30
Payer: MEDICARE

## 2024-01-30 VITALS
HEIGHT: 66 IN | SYSTOLIC BLOOD PRESSURE: 124 MMHG | HEART RATE: 79 BPM | RESPIRATION RATE: 18 BRPM | OXYGEN SATURATION: 97 % | WEIGHT: 201.5 LBS | DIASTOLIC BLOOD PRESSURE: 88 MMHG | BODY MASS INDEX: 32.38 KG/M2

## 2024-01-30 DIAGNOSIS — I50.42 CHRONIC COMBINED SYSTOLIC AND DIASTOLIC HEART FAILURE: ICD-10-CM

## 2024-01-30 DIAGNOSIS — Z87.448 HISTORY OF GLOMERULONEPHRITIS: ICD-10-CM

## 2024-01-30 DIAGNOSIS — N05.0 MINIMAL CHANGE DISEASE: Primary | ICD-10-CM

## 2024-01-30 DIAGNOSIS — I10 PRIMARY HYPERTENSION: ICD-10-CM

## 2024-01-30 DIAGNOSIS — E11.8 DM TYPE 2 CAUSING COMPLICATION: ICD-10-CM

## 2024-01-30 DIAGNOSIS — Z87.39 HISTORY OF GOUT: ICD-10-CM

## 2024-01-30 PROCEDURE — 99999 PR PBB SHADOW E&M-EST. PATIENT-LVL V: CPT | Mod: PBBFAC,,, | Performed by: NURSE PRACTITIONER

## 2024-01-30 PROCEDURE — 99204 OFFICE O/P NEW MOD 45 MIN: CPT | Mod: S$PBB,,, | Performed by: NURSE PRACTITIONER

## 2024-01-30 PROCEDURE — 99215 OFFICE O/P EST HI 40 MIN: CPT | Mod: PBBFAC | Performed by: NURSE PRACTITIONER

## 2024-01-30 NOTE — PROGRESS NOTES
"Subjective:       Patient ID: Ramesh Acosta is a 69 y.o.  male who presents for new evaluation of history of glomerulonephritis.      HPI     Ramesh Acosta is a 69 year old male with T2DM, HTN, combined systolic and diastolic heart failure, nonischemic cardiomyopathy s/p ICD that presents for evaluation of renal function with history of glomerulonephritis. Patient previously followed by Nephrology in [place], [place] (last seen in 2020). Per previous HPI:   "HPI: Ramesh Acosta is a 65 y.o. male with past medical history significant for hypertension along with history of sinoatrial node Dysfunction presented to the hospital after he had a presyncopal episode.  Patient saw his primary care physician and was found to have low albumin levels. He was referred to gastroenterology who switched his medicines and started him on Lasix and spironolactone. He took those medicines and became extremely hypotensive and almost passed out.  Patient presented to the hospital with significant hypotension. All the medication was stopped. Patient was placed on IV fluids. His blood pressures are getting better.  Onset of patient's episode of dizziness stayed stable in progression associated with presyncope. Nothing was making the symptoms better or worse.  Nephrology has been consulted to assist with nephrotic syndrome evaluation given the significant drop in albumin and elevated protein creatinine ratio.  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   SPEP and UPEP negative.   FLC 1.3   MARLON negative "    He completed prednisone course on 7/10/19. He moved to the [facility] in August 2023. He has a history of gout flares but none recently since he stopped drinking alcohol. He denies cp, sob, swelling, urinary symptoms.   The patient denies taking NSAIDs, herbal supplements, or new antibiotics, recreational drugs, recent episode of dehydration, diarrhea, nausea or vomiting, acute illness, hospitalization or " "exposure to IV radiocontrast.       Significant family hx includes: No known renal disease    Last renal US: None available , reviewed.      Review of Systems   Constitutional:  Negative for appetite change.   Respiratory:  Negative for shortness of breath.    Cardiovascular:  Negative for leg swelling.   Gastrointestinal:  Positive for nausea (once or twice per week with medications?). Negative for diarrhea and vomiting.   Genitourinary:  Negative for difficulty urinating, dysuria and hematuria.   Musculoskeletal:  Negative for arthralgias.   All other systems reviewed and are negative.    Objective:       Blood pressure 124/88, pulse 79, resp. rate 18, height 5' 6" (1.676 m), weight 91.4 kg (201 lb 8 oz), SpO2 97 %.  Physical Exam  Vitals reviewed.   Constitutional:       General: He is not in acute distress.     Appearance: Normal appearance.   HENT:      Head: Normocephalic and atraumatic.   Eyes:      General: No scleral icterus.  Cardiovascular:      Rate and Rhythm: Normal rate and regular rhythm.   Pulmonary:      Effort: Pulmonary effort is normal. No respiratory distress.      Breath sounds: No wheezing or rales.   Musculoskeletal:      Right lower leg: No edema.      Left lower leg: No edema.   Skin:     General: Skin is warm and dry.   Neurological:      Mental Status: He is alert and oriented to person, place, and time.   Psychiatric:         Mood and Affect: Mood normal.         Behavior: Behavior normal.           Lab Results   Component Value Date    CREATININE 1.0 11/21/2023     No results found for: "UTPCR"  Lab Results   Component Value Date     11/21/2023    K 3.6 11/21/2023    CO2 26 11/21/2023     11/21/2023     Lab Results   Component Value Date    CALCIUM 9.1 11/21/2023     Lab Results   Component Value Date    HGB 15.5 09/30/2023    WBC 7.77 09/30/2023    HCT 45.2 09/30/2023      Lab Results   Component Value Date    HGBA1C 6.2 (H) 09/19/2023     09/30/2023    BUN 15 " 11/21/2023     Lab Results   Component Value Date    LDLCALC 62.0 (L) 09/19/2023         Assessment:       1. Minimal change disease    2. History of glomerulonephritis    3. Primary hypertension    4. Chronic combined systolic and diastolic heart failure    5. History of gout    6. DM type 2 causing complication        Plan:   Minimal Change Disease  - Per biopsy in May 2019. He completed prednisone course in July 2019 with improvement in proteinuria (4.69g in May 2019----> 90mg in May 2020).  - sCr stable at 1.0.   - Maintained on Valsartan 320 at last visit. Now on Entresto and empagliflozin per cardiology.   - Continue to monitor renal function, UPCR. Avoid NSAIDs. Maintain hydration.  - Check renal US as well    HTN - Stable today on current regimen. Defer to cardiology.    Combined systolic and diastolic heart failure - Appears euvolemic. Defer to cardiology.     Gout - no recent flares. Continue low purine diet. Avoid NSAIDs. Monitor uric acid.     All questions patient had were answered.  Asked if further questions. None. F/u in clinic 4/9/24  with labs and urine prior to next visit or sooner if needed.  ER for emergency concerns.    Summary of Plan:

## 2024-02-05 RX ORDER — HYDROXYZINE HYDROCHLORIDE 25 MG/1
25 TABLET, FILM COATED ORAL NIGHTLY
Qty: 20 TABLET | Refills: 0 | Status: SHIPPED | OUTPATIENT
Start: 2024-02-05 | End: 2024-03-14

## 2024-02-21 ENCOUNTER — OFFICE VISIT (OUTPATIENT)
Dept: DERMATOLOGY | Facility: CLINIC | Age: 69
End: 2024-02-21
Payer: MEDICARE

## 2024-02-21 DIAGNOSIS — L30.9 ECZEMA, UNSPECIFIED TYPE: Primary | ICD-10-CM

## 2024-02-21 PROCEDURE — 99213 OFFICE O/P EST LOW 20 MIN: CPT | Mod: PBBFAC | Performed by: STUDENT IN AN ORGANIZED HEALTH CARE EDUCATION/TRAINING PROGRAM

## 2024-02-21 PROCEDURE — 99999 PR PBB SHADOW E&M-EST. PATIENT-LVL III: CPT | Mod: PBBFAC,,, | Performed by: STUDENT IN AN ORGANIZED HEALTH CARE EDUCATION/TRAINING PROGRAM

## 2024-02-21 PROCEDURE — 99204 OFFICE O/P NEW MOD 45 MIN: CPT | Mod: S$PBB,,, | Performed by: STUDENT IN AN ORGANIZED HEALTH CARE EDUCATION/TRAINING PROGRAM

## 2024-02-21 RX ORDER — TRIAMCINOLONE ACETONIDE 1 MG/G
CREAM TOPICAL
Qty: 80 G | Refills: 3 | Status: SHIPPED | OUTPATIENT
Start: 2024-02-21

## 2024-02-21 NOTE — PROGRESS NOTES
Subjective:      Patient ID:  Ramesh Acosta is a 69 y.o. male who presents for   Chief Complaint   Patient presents with    Eczema     Hands, Abdomen, Back     69 y.o. male presents for eczema.    New patient   Here to establish care after recently moving back to [place]    1. Eczema   Location: hands, abdomen, back  Duration: 30 years  Current treatment: OTC cortisone cream - using daily  Prior treatments tried/failed: 3% cortisone was prescribed in past which helped some  Skin care products (i.e. soap, detergent): Cetaphil soap, Free and Clear detergents  History of allergies (seasonal, food): yes - grass and oysters  History of asthma: no  Family history of eczema: no  He is mostly clear today with mild rash and wants to establish care         Review of Systems   Constitutional:  Negative for fever.   HENT:  Positive for congestion. Negative for rhinorrhea and postnasal drip.    Eyes:  Negative for itching.   Skin:  Positive for itching, rash and dry skin.   Allergic/Immunologic: Positive for environmental allergies.       Objective:   Physical Exam   Skin:   Areas Examined (abnormalities noted in diagram):   Abdomen Inspection Performed  Back Inspection Performed  RUE Inspected  LUE Inspection Performed       Diagram Legend     Erythematous scaling macule/papule c/w actinic keratosis       Vascular papule c/w angioma      Pigmented verrucoid papule/plaque c/w seborrheic keratosis      Yellow umbilicated papule c/w sebaceous hyperplasia      Irregularly shaped tan macule c/w lentigo     1-2 mm smooth white papules consistent with Milia      Movable subcutaneous cyst with punctum c/w epidermal inclusion cyst      Subcutaneous movable cyst c/w pilar cyst      Firm pink to brown papule c/w dermatofibroma      Pedunculated fleshy papule(s) c/w skin tag(s)      Evenly pigmented macule c/w junctional nevus     Mildly variegated pigmented, slightly irregular-bordered macule c/w mildly atypical nevus       Flesh colored to evenly pigmented papule c/w intradermal nevus       Pink pearly papule/plaque c/w basal cell carcinoma      Erythematous hyperkeratotic cursted plaque c/w SCC      Surgical scar with no sign of skin cancer recurrence      Open and closed comedones      Inflammatory papules and pustules      Verrucoid papule consistent consistent with wart     Erythematous eczematous patches and plaques     Dystrophic onycholytic nail with subungual debris c/w onychomycosis     Umbilicated papule    Erythematous-base heme-crusted tan verrucoid plaque consistent with inflamed seborrheic keratosis     Erythematous Silvery Scaling Plaque c/w Psoriasis     See annotation      Assessment / Plan:        Eczema  Mostly clear today, here to establish care and medication refills    Recommend bathing in lukewarm water (not scalding hot) no more than 5-10 minutes per day. Recommend fragrance-free detergent (I.e. All Free and Clear) and soap (I.e. Dove sensitive skin bar soap) only to soiled areas of body. Recommend liberal use of moisturizing cream (I.e. CeraVe cream, Cetaphil cream) especially within 5 minutes of bathing.    Start triamcinolone cream bid to AA max 2 weeks per month    Contact office for worsening or flares

## 2024-02-22 ENCOUNTER — PATIENT MESSAGE (OUTPATIENT)
Dept: INTERNAL MEDICINE | Facility: CLINIC | Age: 69
End: 2024-02-22
Payer: MEDICARE

## 2024-02-22 RX ORDER — PEN NEEDLE, DIABETIC 30 GX3/16"
NEEDLE, DISPOSABLE MISCELLANEOUS
Status: CANCELLED | OUTPATIENT
Start: 2024-02-22

## 2024-02-22 RX ORDER — PEN NEEDLE, DIABETIC 30 GX3/16"
1 NEEDLE, DISPOSABLE MISCELLANEOUS NIGHTLY
Qty: 100 EACH | Refills: 3 | Status: SHIPPED | OUTPATIENT
Start: 2024-02-22

## 2024-02-22 NOTE — TELEPHONE ENCOUNTER
Care Due:                  Date            Visit Type   Department     Provider  --------------------------------------------------------------------------------                                NP -                              PRIMARY      [facility] INTERNAL  Last Visit: 09-      CARE ([facility])   MEDICINE       Baldemar Savage  Next Visit: None Scheduled  None         None Found                                                            Last  Test          Frequency    Reason                     Performed    Due Date  --------------------------------------------------------------------------------    HBA1C.......  6 months...  insulin..................  09- 03-    [facility] Embedded Care Due Messages. Reference number: [id number].   2/22/2024 4:06:55 PM CST

## 2024-03-06 RX ORDER — INSULIN GLARGINE 100 [IU]/ML
10 INJECTION, SOLUTION SUBCUTANEOUS NIGHTLY
Qty: 9 ML | Refills: 3 | Status: SHIPPED | OUTPATIENT
Start: 2024-03-06 | End: 2025-03-06

## 2024-03-06 NOTE — TELEPHONE ENCOUNTER
Ok and to be proactive I sent a script for Lantus to replace Levemir which will soon be discontinued.  He can finish the Levemir that he has but this way he will already have the new prescription available.  Double check to be sure that the proper pain needles and supplies were already taken care of.

## 2024-03-12 ENCOUNTER — CLINICAL SUPPORT (OUTPATIENT)
Dept: CARDIOLOGY | Facility: HOSPITAL | Age: 69
End: 2024-03-12
Attending: INTERNAL MEDICINE
Payer: COMMERCIAL

## 2024-03-12 ENCOUNTER — CLINICAL SUPPORT (OUTPATIENT)
Dept: CARDIOLOGY | Facility: HOSPITAL | Age: 69
End: 2024-03-12
Payer: MEDICARE

## 2024-03-12 DIAGNOSIS — I48.0 PAROXYSMAL ATRIAL FIBRILLATION: ICD-10-CM

## 2024-03-12 PROCEDURE — 93295 DEV INTERROG REMOTE 1/2/MLT: CPT | Mod: ,,, | Performed by: INTERNAL MEDICINE

## 2024-03-12 PROCEDURE — 93296 REM INTERROG EVL PM/IDS: CPT | Performed by: INTERNAL MEDICINE

## 2024-03-14 RX ORDER — HYDROXYZINE HYDROCHLORIDE 25 MG/1
25 TABLET, FILM COATED ORAL NIGHTLY
Qty: 20 TABLET | Refills: 0 | Status: SHIPPED | OUTPATIENT
Start: 2024-03-14 | End: 2024-04-25

## 2024-03-26 LAB
OHS CV AF BURDEN PERCENT: < 1
OHS CV DC REMOTE DEVICE TYPE: NORMAL
OHS CV RV PACING PERCENT: 0.47 %

## 2024-03-27 DIAGNOSIS — E11.9 TYPE 2 DIABETES MELLITUS WITHOUT COMPLICATION, UNSPECIFIED WHETHER LONG TERM INSULIN USE: ICD-10-CM

## 2024-03-27 DIAGNOSIS — E11.9 TYPE 2 DIABETES MELLITUS WITHOUT COMPLICATION: ICD-10-CM

## 2024-04-02 ENCOUNTER — HOSPITAL ENCOUNTER (OUTPATIENT)
Dept: RADIOLOGY | Facility: HOSPITAL | Age: 69
Discharge: HOME | End: 2024-04-02
Attending: NURSE PRACTITIONER
Payer: MEDICARE

## 2024-04-02 DIAGNOSIS — Z87.448 HISTORY OF GLOMERULONEPHRITIS: ICD-10-CM

## 2024-04-02 PROCEDURE — 76770 US EXAM ABDO BACK WALL COMP: CPT | Mod: 26,,, | Performed by: INTERNAL MEDICINE

## 2024-04-02 PROCEDURE — 76770 US EXAM ABDO BACK WALL COMP: CPT | Mod: TC

## 2024-04-03 ENCOUNTER — LAB VISIT (OUTPATIENT)
Dept: LAB | Facility: HOSPITAL | Age: 69
End: 2024-04-03
Payer: MEDICARE

## 2024-04-03 DIAGNOSIS — Z87.448 HISTORY OF GLOMERULONEPHRITIS: ICD-10-CM

## 2024-04-03 LAB
25(OH)D3+25(OH)D2 SERPL-MCNC: 14 NG/ML (ref 30–96)
ALBUMIN SERPL BCP-MCNC: 3.7 G/DL (ref 3.5–5.2)
ANION GAP SERPL CALC-SCNC: 10 MMOL/L (ref 8–16)
BACTERIA #/AREA URNS AUTO: NORMAL /HPF
BILIRUB UR QL STRIP: NEGATIVE
BUN SERPL-MCNC: 17 MG/DL (ref 8–23)
CALCIUM SERPL-MCNC: 9.2 MG/DL (ref 8.7–10.5)
CHLORIDE SERPL-SCNC: 105 MMOL/L (ref 95–110)
CLARITY UR REFRACT.AUTO: CLEAR
CO2 SERPL-SCNC: 26 MMOL/L (ref 23–29)
COLOR UR AUTO: YELLOW
CREAT SERPL-MCNC: 1.1 MG/DL (ref 0.5–1.4)
CREAT UR-MCNC: 103 MG/DL (ref 23–375)
ERYTHROCYTE [DISTWIDTH] IN BLOOD BY AUTOMATED COUNT: 13.1 % (ref 11.5–14.5)
EST. GFR  (NO RACE VARIABLE): >60 ML/MIN/1.73 M^2
GLUCOSE SERPL-MCNC: 140 MG/DL (ref 70–110)
GLUCOSE UR QL STRIP: ABNORMAL
HCT VFR BLD AUTO: 46.6 % (ref 40–54)
HGB BLD-MCNC: 15.5 G/DL (ref 14–18)
HGB UR QL STRIP: NEGATIVE
KETONES UR QL STRIP: NEGATIVE
LEUKOCYTE ESTERASE UR QL STRIP: NEGATIVE
MCH RBC QN AUTO: 30.6 PG (ref 27–31)
MCHC RBC AUTO-ENTMCNC: 33.3 G/DL (ref 32–36)
MCV RBC AUTO: 92 FL (ref 82–98)
MICROSCOPIC COMMENT: NORMAL
NITRITE UR QL STRIP: NEGATIVE
PH UR STRIP: 6 [PH] (ref 5–8)
PHOSPHATE SERPL-MCNC: 4 MG/DL (ref 2.7–4.5)
PLATELET # BLD AUTO: 162 K/UL (ref 150–450)
PMV BLD AUTO: 9.4 FL (ref 9.2–12.9)
POTASSIUM SERPL-SCNC: 3.6 MMOL/L (ref 3.5–5.1)
PROT UR QL STRIP: NEGATIVE
PROT UR-MCNC: 16 MG/DL (ref 0–15)
PROT/CREAT UR: 0.16 MG/G{CREAT} (ref 0–0.2)
PTH-INTACT SERPL-MCNC: 155.1 PG/ML (ref 9–77)
RBC # BLD AUTO: 5.07 M/UL (ref 4.6–6.2)
SODIUM SERPL-SCNC: 141 MMOL/L (ref 136–145)
SP GR UR STRIP: >1.03 (ref 1–1.03)
URN SPEC COLLECT METH UR: ABNORMAL
WBC # BLD AUTO: 6.69 K/UL (ref 3.9–12.7)
WBC #/AREA URNS AUTO: 0 /HPF (ref 0–5)
YEAST UR QL AUTO: NORMAL

## 2024-04-03 PROCEDURE — 81001 URINALYSIS AUTO W/SCOPE: CPT | Performed by: NURSE PRACTITIONER

## 2024-04-03 PROCEDURE — 85027 COMPLETE CBC AUTOMATED: CPT | Performed by: NURSE PRACTITIONER

## 2024-04-03 PROCEDURE — 83970 ASSAY OF PARATHORMONE: CPT | Performed by: NURSE PRACTITIONER

## 2024-04-03 PROCEDURE — 36415 COLL VENOUS BLD VENIPUNCTURE: CPT | Performed by: NURSE PRACTITIONER

## 2024-04-03 PROCEDURE — 82306 VITAMIN D 25 HYDROXY: CPT | Performed by: NURSE PRACTITIONER

## 2024-04-03 PROCEDURE — 80069 RENAL FUNCTION PANEL: CPT | Performed by: NURSE PRACTITIONER

## 2024-04-03 PROCEDURE — 84156 ASSAY OF PROTEIN URINE: CPT | Performed by: NURSE PRACTITIONER

## 2024-04-09 ENCOUNTER — OFFICE VISIT (OUTPATIENT)
Dept: NEPHROLOGY | Facility: CLINIC | Age: 69
End: 2024-04-09
Payer: MEDICARE

## 2024-04-09 VITALS
HEART RATE: 93 BPM | OXYGEN SATURATION: 98 % | DIASTOLIC BLOOD PRESSURE: 86 MMHG | WEIGHT: 195 LBS | SYSTOLIC BLOOD PRESSURE: 121 MMHG | BODY MASS INDEX: 31.47 KG/M2

## 2024-04-09 DIAGNOSIS — I10 PRIMARY HYPERTENSION: ICD-10-CM

## 2024-04-09 DIAGNOSIS — E55.9 VITAMIN D DEFICIENCY, UNSPECIFIED: ICD-10-CM

## 2024-04-09 DIAGNOSIS — Z87.448 HISTORY OF GLOMERULONEPHRITIS: ICD-10-CM

## 2024-04-09 DIAGNOSIS — I50.42 CHRONIC COMBINED SYSTOLIC AND DIASTOLIC HEART FAILURE: ICD-10-CM

## 2024-04-09 DIAGNOSIS — Z87.39 HISTORY OF GOUT: ICD-10-CM

## 2024-04-09 DIAGNOSIS — E21.3 HYPERPARATHYROIDISM, UNSPECIFIED: ICD-10-CM

## 2024-04-09 DIAGNOSIS — N05.0 MINIMAL CHANGE DISEASE: Primary | ICD-10-CM

## 2024-04-09 PROCEDURE — 99999 PR PBB SHADOW E&M-EST. PATIENT-LVL III: CPT | Mod: PBBFAC,,, | Performed by: NURSE PRACTITIONER

## 2024-04-09 PROCEDURE — 99213 OFFICE O/P EST LOW 20 MIN: CPT | Mod: PBBFAC | Performed by: NURSE PRACTITIONER

## 2024-04-09 PROCEDURE — 99214 OFFICE O/P EST MOD 30 MIN: CPT | Mod: S$PBB,,, | Performed by: NURSE PRACTITIONER

## 2024-04-09 RX ORDER — ERGOCALCIFEROL 1.25 MG/1
50000 CAPSULE ORAL
Qty: 4 CAPSULE | Refills: 2 | Status: SHIPPED | OUTPATIENT
Start: 2024-04-09 | End: 2024-06-26

## 2024-04-09 NOTE — PROGRESS NOTES
"Subjective:       Patient ID: Ramesh Acosta is a 69 y.o.  male who presents for new evaluation of history of glomerulonephritis.      HPI     Ramesh Acosta is a 69 year old male with T2DM, HTN, combined systolic and diastolic heart failure, nonischemic cardiomyopathy s/p ICD that presents for evaluation of renal function with history of glomerulonephritis. Patient previously followed by Nephrology in [place], [place] (last seen in 2020). Per previous HPI:   "HPI: Ramesh Acosta is a 65 y.o. male with past medical history significant for hypertension along with history of sinoatrial node Dysfunction presented to the hospital after he had a presyncopal episode.  Patient saw his primary care physician and was found to have low albumin levels. He was referred to gastroenterology who switched his medicines and started him on Lasix and spironolactone. He took those medicines and became extremely hypotensive and almost passed out.  Patient presented to the hospital with significant hypotension. All the medication was stopped. Patient was placed on IV fluids. His blood pressures are getting better.  Onset of patient's episode of dizziness stayed stable in progression associated with presyncope. Nothing was making the symptoms better or worse.  Nephrology has been consulted to assist with nephrotic syndrome evaluation given the significant drop in albumin and elevated protein creatinine ratio.  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   SPEP and UPEP negative.   FLC 1.3   MARLON negative "    He completed prednisone course on 7/10/19. He moved to the [facility] in August 2023. He has a history of gout flares but none recently since he stopped drinking alcohol. He denies cp, sob, swelling, urinary symptoms.   The patient denies taking NSAIDs, herbal supplements, or new antibiotics, recreational drugs, recent episode of dehydration, diarrhea, nausea or vomiting, acute illness, hospitalization or " exposure to IV radiocontrast.       Significant family hx includes: No known renal disease    Last renal US: None available , reviewed.    Update 4/9/24:  - Presents for follow-up of renal function and history of minimal change disease  - sCr within baseline at 1.1  - No new issues or complaints.     Review of Systems   Respiratory:  Negative for shortness of breath.    Cardiovascular:  Negative for leg swelling.   Gastrointestinal:  Negative for diarrhea, nausea and vomiting.   Genitourinary:  Negative for difficulty urinating, dysuria and hematuria.   All other systems reviewed and are negative.      Objective:       Blood pressure 121/86, pulse 93, weight 88.5 kg (195 lb), SpO2 98 %.  Physical Exam  Vitals reviewed.   Constitutional:       General: He is not in acute distress.     Appearance: Normal appearance.   HENT:      Head: Normocephalic and atraumatic.   Eyes:      General: No scleral icterus.  Cardiovascular:      Rate and Rhythm: Normal rate.   Pulmonary:      Effort: Pulmonary effort is normal. No respiratory distress.   Musculoskeletal:      Right lower leg: No edema.      Left lower leg: No edema.   Skin:     General: Skin is warm and dry.   Neurological:      Mental Status: He is alert and oriented to person, place, and time.   Psychiatric:         Mood and Affect: Mood normal.         Behavior: Behavior normal.           Lab Results   Component Value Date    CREATININE 1.1 04/03/2024     Prot/Creat Ratio, Urine   Date Value Ref Range Status   04/03/2024 0.16 0.00 - 0.20 Final     Lab Results   Component Value Date     04/03/2024    K 3.6 04/03/2024    CO2 26 04/03/2024     04/03/2024     Lab Results   Component Value Date    .1 (H) 04/03/2024    CALCIUM 9.2 04/03/2024    PHOS 4.0 04/03/2024     Lab Results   Component Value Date    HGB 15.5 04/03/2024    WBC 6.69 04/03/2024    HCT 46.6 04/03/2024      Lab Results   Component Value Date    HGBA1C 6.2 (H) 09/19/2023      04/03/2024    BUN 17 04/03/2024     Lab Results   Component Value Date    LDLCALC 62.0 (L) 09/19/2023     Renal US 4/2/24:  FINDINGS:  Right kidney: The right kidney measures 10.5 cm. There is cortical thinning. No loss of corticomedullary distinction.  Decreased cortical perfusion.  Resistive index measures 0.72.  No mass. No renal stone. No hydronephrosis.     Left kidney: The left kidney measures 11.4 cm. There is cortical thinning. No loss of corticomedullary distinction.  Decreased cortical perfusion.  Resistive index measures 0.72.  No mass. No renal stone. No hydronephrosis.     Splenic resistive index measures 0.58.     The bladder is partially distended at the time of scanning and has an unremarkable appearance.     Prostate gland measures 5.1 x 4.7 x 3.8 cm.     Impression:     Signs of chronic medical renal disease bilaterally.     No hydronephrosis or renal mass.     Prostatomegaly.    Assessment:       1. Minimal change disease    2. History of glomerulonephritis    3. Primary hypertension    4. Chronic combined systolic and diastolic heart failure    5. History of gout    6. Hyperparathyroidism, unspecified    7. Vitamin D deficiency, unspecified          Plan:   Minimal Change Disease  - Per biopsy in May 2019. He completed prednisone course in July 2019 with improvement in proteinuria (4.69g in May 2019----> 90mg in May 2020).  - UPCR 160mg, stable. Monitor.   - sCr stable at 1.0-1.1.   - Maintained on Valsartan 320 at last visit. Now on Entresto and empagliflozin per cardiology.   - Continue to monitor renal function, UPCR. Avoid NSAIDs. Maintain hydration.    Hyperparathyroidism  - PTH elevated, Vit D def  - Ca and phos okay  - Rx ergo q wk. Monitor.     HTN - Stable today on current regimen. Defer to cardiology.    Combined systolic and diastolic heart failure - Appears euvolemic. Defer to cardiology. Now maintained on entresto and SGLT2i.     Gout - no recent flares. Continue low purine diet.  Avoid NSAIDs. Monitor uric acid.     All questions patient had were answered.  Asked if further questions. None. F/u in clinic 6 months with labs and urine prior to next visit or sooner if needed.  ER for emergency concerns.    Summary of Plan:  - Rx ergo qwk  - RTC 6 months with labs for monitoring

## 2024-04-23 ENCOUNTER — PATIENT OUTREACH (OUTPATIENT)
Dept: ADMINISTRATIVE | Facility: HOSPITAL | Age: 69
End: 2024-04-23
Payer: MEDICARE

## 2024-04-23 ENCOUNTER — PATIENT MESSAGE (OUTPATIENT)
Dept: ADMINISTRATIVE | Facility: HOSPITAL | Age: 69
End: 2024-04-23
Payer: MEDICARE

## 2024-04-23 NOTE — PROGRESS NOTES
Health Maintenance Due   Topic Date Due    Diabetes Urine Screening  Never done    Foot Exam  Never done    Eye Exam  Never done    Shingles Vaccine (1 of 2) Never done    RSV Vaccine (Age 60+ and Pregnant patients) (1 - 1-dose 60+ series) Never done    TETANUS VACCINE  02/15/2021    Pneumococcal Vaccines (Age 65+) (2 of 2 - PCV) 12/17/2022    Hemoglobin A1c  03/19/2024   DM Urine, Eye and A1c due orders placed for scheduling. LVM and portal message sent for scheduling.   Health Maintenance reviewed, updated and links triggered. ([place]) 4/23/24

## 2024-04-24 ENCOUNTER — PATIENT MESSAGE (OUTPATIENT)
Dept: ADMINISTRATIVE | Facility: HOSPITAL | Age: 69
End: 2024-04-24
Payer: MEDICARE

## 2024-04-25 ENCOUNTER — PATIENT OUTREACH (OUTPATIENT)
Dept: ADMINISTRATIVE | Facility: HOSPITAL | Age: 69
End: 2024-04-25
Payer: MEDICARE

## 2024-04-25 RX ORDER — HYDROXYZINE HYDROCHLORIDE 25 MG/1
25 TABLET, FILM COATED ORAL NIGHTLY
Qty: 20 TABLET | Refills: 0 | Status: SHIPPED | OUTPATIENT
Start: 2024-04-25 | End: 2024-05-27

## 2024-04-25 NOTE — PROGRESS NOTES
Health Maintenance Due   Topic Date Due    Diabetes Urine Screening  Never done    Foot Exam  Never done    Eye Exam  Never done    Shingles Vaccine (1 of 2) Never done    RSV Vaccine (Age 60+ and Pregnant patients) (1 - 1-dose 60+ series) Never done    TETANUS VACCINE  02/15/2021    Pneumococcal Vaccines (Age 65+) (2 of 2 - PCV) 12/17/2022    Hemoglobin A1c  03/19/2024    Health Maintenance reviewed, updated and links triggered. Shingrix vaccine update second injection due.   Patient sent portal message for scheduling waiting on date and time for labs, foot and eye exam. ([facility]) 4/25/24

## 2024-04-25 NOTE — TELEPHONE ENCOUNTER
Refill Routing Note   Medication(s) are not appropriate for processing by [facility] for the following reason(s):        Outside of protocol    [facility] action(s):  Route               Appointments  past 12m or future 3m with PCP    Date Provider   Last Visit   9/26/2023 Baldemar Savage,    Next Visit   Visit date not found Baldemar Savage, DO   ED visits in past 90 days: 0        Note composed:8:27 AM 04/25/2024

## 2024-04-30 ENCOUNTER — PATIENT MESSAGE (OUTPATIENT)
Dept: ADMINISTRATIVE | Facility: HOSPITAL | Age: 69
End: 2024-04-30
Payer: MEDICARE

## 2024-04-30 ENCOUNTER — PATIENT OUTREACH (OUTPATIENT)
Dept: ADMINISTRATIVE | Facility: HOSPITAL | Age: 69
End: 2024-04-30
Payer: MEDICARE

## 2024-04-30 NOTE — PROGRESS NOTES
Health Maintenance Due   Topic Date Due    Foot Exam  Never done    RSV Vaccine (Age 60+ and Pregnant patients) (1 - 1-dose 60+ series) Never done    TETANUS VACCINE  02/15/2021    Pneumococcal Vaccines (Age 65+) (2 of 2 - PCV) 12/17/2022    Shingles Vaccine (2 of 2) 09/12/2023    Health Maintenance reviewed, updated and links triggered. Shingrix vaccine update second injection due.   Patient scheduled for labs and eye exam on 5/6/24. ([facility]) 4/30/24

## 2024-05-01 ENCOUNTER — PATIENT OUTREACH (OUTPATIENT)
Dept: ADMINISTRATIVE | Facility: HOSPITAL | Age: 69
End: 2024-05-01
Payer: MEDICARE

## 2024-05-06 ENCOUNTER — LAB VISIT (OUTPATIENT)
Dept: LAB | Facility: OTHER | Age: 69
End: 2024-05-06
Attending: FAMILY MEDICINE
Payer: MEDICARE

## 2024-05-06 ENCOUNTER — CLINICAL SUPPORT (OUTPATIENT)
Dept: INTERNAL MEDICINE | Facility: CLINIC | Age: 69
End: 2024-05-06
Attending: FAMILY MEDICINE
Payer: MEDICARE

## 2024-05-06 DIAGNOSIS — E11.9 TYPE 2 DIABETES MELLITUS WITHOUT COMPLICATION, UNSPECIFIED WHETHER LONG TERM INSULIN USE: ICD-10-CM

## 2024-05-06 DIAGNOSIS — E11.9 TYPE 2 DIABETES MELLITUS WITHOUT COMPLICATION: ICD-10-CM

## 2024-05-06 LAB
ESTIMATED AVG GLUCOSE: 140 MG/DL (ref 68–131)
HBA1C MFR BLD: 6.5 % (ref 4–5.6)

## 2024-05-06 PROCEDURE — 83036 HEMOGLOBIN GLYCOSYLATED A1C: CPT | Performed by: FAMILY MEDICINE

## 2024-05-06 PROCEDURE — 36415 COLL VENOUS BLD VENIPUNCTURE: CPT | Performed by: FAMILY MEDICINE

## 2024-05-06 PROCEDURE — 92228 IMG RTA DETC/MNTR DS PHY/QHP: CPT | Mod: PBBFAC

## 2024-05-06 PROCEDURE — 92228 IMG RTA DETC/MNTR DS PHY/QHP: CPT | Mod: 26,S$PBB,, | Performed by: OPTOMETRIST

## 2024-05-06 NOTE — PROGRESS NOTES
Ramesh Acosta is a 69 y.o. male here for a diabetic eye screening with non-dilated fundus photos per Dr. Savage.    Patient cooperative?: Yes  Small pupils?: Yes  Last eye exam: Unknown     For exam results, see Encounter Report.

## 2024-05-16 ENCOUNTER — PATIENT MESSAGE (OUTPATIENT)
Dept: CARDIOLOGY | Facility: CLINIC | Age: 69
End: 2024-05-16
Payer: MEDICARE

## 2024-05-17 ENCOUNTER — TELEPHONE (OUTPATIENT)
Dept: CARDIOLOGY | Facility: CLINIC | Age: 69
End: 2024-05-17
Payer: MEDICARE

## 2024-05-17 NOTE — TELEPHONE ENCOUNTER
----- Message from Ghislaine Flores RN sent at 5/17/2024  1:33 PM CDT -----  Regarding: BP low/ high  Spoke w. pt today following yesterday's messages and ER visit. Pt is now discharged. Unable to get records fr [place] apparently due to name discrepancy.   Apparently what happened is that pt stated that he was told to double his dose of carvedilol by dr Lai. He was taking at the time 25 mg bid (12.5 x 2). So he started taking 50 mg bid (12.5 x 4 bid) and his pressure was low including a syncope episode. At the time of the ER visit he had stopped his carvedilol altogether which explained the high readings. His Am BP is 140/110 and he intends on restarting his carvedilol as 25mg bid. I will call him back to schedule a f/u appt after I clarify whether he is followed by dr Lai or dr Azul or both. He verbalized understanding.

## 2024-05-17 NOTE — TELEPHONE ENCOUNTER
Pt updated on the fact that is care is now under dr Lai. I told him to keep on checking bp and go back to ER prn. He verbalized understanding.   Message forwarded to dr Lai's team to handle.

## 2024-05-22 ENCOUNTER — OFFICE VISIT (OUTPATIENT)
Dept: DERMATOLOGY | Facility: CLINIC | Age: 69
End: 2024-05-22
Payer: MEDICARE

## 2024-05-22 DIAGNOSIS — L82.0 SEBORRHEIC KERATOSIS, INFLAMED: ICD-10-CM

## 2024-05-22 DIAGNOSIS — L72.0 EPIDERMAL INCLUSION CYST: Primary | ICD-10-CM

## 2024-05-22 PROCEDURE — 99999 PR PBB SHADOW E&M-EST. PATIENT-LVL III: CPT | Mod: PBBFAC,,, | Performed by: DERMATOLOGY

## 2024-05-22 PROCEDURE — 99212 OFFICE O/P EST SF 10 MIN: CPT | Mod: 25,S$PBB,, | Performed by: DERMATOLOGY

## 2024-05-22 PROCEDURE — 99213 OFFICE O/P EST LOW 20 MIN: CPT | Mod: PBBFAC | Performed by: DERMATOLOGY

## 2024-05-22 PROCEDURE — 17110 DESTRUCTION B9 LES UP TO 14: CPT | Mod: PBBFAC | Performed by: DERMATOLOGY

## 2024-05-22 PROCEDURE — 17110 DESTRUCTION B9 LES UP TO 14: CPT | Mod: S$PBB,,, | Performed by: DERMATOLOGY

## 2024-05-22 NOTE — PROGRESS NOTES
I have seen the patient, reviewed the Resident's progress note. I have personally interviewed and examined the patient at bedside and agree with the findings.     Micheline Marks MD  [facility]

## 2024-05-22 NOTE — PROGRESS NOTES
Subjective:      Patient ID:  Ramesh Acosta is a 69 y.o. male who presents for   Chief Complaint   Patient presents with    Rash     HPI  69-year-old gentleman with history of eczema presents for above. Last seen 2/2024 by Dr. Gonzalez as a new patient during which time skin was mostly clear; he was started on triamcinolone cream and was counseled on sensitive skin habits.     Today, patient endorses spot on the anterior chest present for about one year. Lesion had intermittently drained malodorous discharge and was painful at times, but now seems asymptomatic though he feels a nodule underneath the skin at that site where drainage was present. No other complaints today. Feels otherwise well. Denies personal history of skin cancer.     Review of Systems  Negative except as above in HPI.   Objective:   Physical Exam   Constitutional: He appears well-developed and well-nourished.   Neurological: He is alert and oriented to person, place, and time.   Psychiatric: He has a normal mood and affect.   Skin:   Areas Examined (abnormalities noted in diagram):   Chest / Axilla Inspection Performed              Diagram Legend     Erythematous scaling macule/papule c/w actinic keratosis       Vascular papule c/w angioma      Pigmented verrucoid papule/plaque c/w seborrheic keratosis      Yellow umbilicated papule c/w sebaceous hyperplasia      Irregularly shaped tan macule c/w lentigo     1-2 mm smooth white papules consistent with Milia      Movable subcutaneous cyst with punctum c/w epidermal inclusion cyst      Subcutaneous movable cyst c/w pilar cyst      Firm pink to brown papule c/w dermatofibroma      Pedunculated fleshy papule(s) c/w skin tag(s)      Evenly pigmented macule c/w junctional nevus     Mildly variegated pigmented, slightly irregular-bordered macule c/w mildly atypical nevus      Flesh colored to evenly pigmented papule c/w intradermal nevus       Pink pearly papule/plaque c/w basal cell carcinoma       Erythematous hyperkeratotic cursted plaque c/w SCC      Surgical scar with no sign of skin cancer recurrence      Open and closed comedones      Inflammatory papules and pustules      Verrucoid papule consistent consistent with wart     Erythematous eczematous patches and plaques     Dystrophic onycholytic nail with subungual debris c/w onychomycosis     Umbilicated papule    Erythematous-base heme-crusted tan verrucoid plaque consistent with inflamed seborrheic keratosis     Erythematous Silvery Scaling Plaque c/w Psoriasis     See annotation      Assessment / Plan:        Ramesh was seen today for rash.    Diagnoses and all orders for this visit:    Epidermal inclusion cyst    Seborrheic keratosis, inflamed       Patient with one year history of intermittently draining lesion to the chest. History of suggestive of an epidermal inclusion cyst and physical examination consistent with small somewhat firm subcutaneous nodule in this area with overlying erythematous stuck on papules consistent with inflamed SK.   -Etiology and nature of EIC and SK's explained to patient  -Amenable to cryosurgical destruction of SK's today.  -EIC seems quiescent today-recommended observation for now    Cryosurgery to 2 inflamed SKs - Anterior chest. Discussed with patient that areas treated with blister (possibly for blood blister), scab and peel off within the next two to three weeks.     RTC prn    Patient staffed with attending physician, Dr. Marks. Attestation to follow.     Adrian Joyce MD  [facility] PGYII

## 2024-05-24 ENCOUNTER — OFFICE VISIT (OUTPATIENT)
Dept: INTERNAL MEDICINE | Facility: CLINIC | Age: 69
End: 2024-05-24
Payer: MEDICARE

## 2024-05-24 ENCOUNTER — OFFICE VISIT (OUTPATIENT)
Dept: TRANSPLANT | Facility: CLINIC | Age: 69
End: 2024-05-24
Payer: MEDICARE

## 2024-05-24 VITALS
HEART RATE: 82 BPM | OXYGEN SATURATION: 98 % | DIASTOLIC BLOOD PRESSURE: 82 MMHG | WEIGHT: 200.38 LBS | SYSTOLIC BLOOD PRESSURE: 134 MMHG | BODY MASS INDEX: 32.2 KG/M2 | HEIGHT: 66 IN

## 2024-05-24 VITALS
BODY MASS INDEX: 32.24 KG/M2 | DIASTOLIC BLOOD PRESSURE: 89 MMHG | SYSTOLIC BLOOD PRESSURE: 135 MMHG | HEIGHT: 66 IN | WEIGHT: 200.63 LBS | HEART RATE: 66 BPM

## 2024-05-24 DIAGNOSIS — E11.8 DM TYPE 2 CAUSING COMPLICATION: ICD-10-CM

## 2024-05-24 DIAGNOSIS — F32.89 OTHER DEPRESSION: ICD-10-CM

## 2024-05-24 DIAGNOSIS — Z95.810 ICD (IMPLANTABLE CARDIOVERTER-DEFIBRILLATOR) IN PLACE: ICD-10-CM

## 2024-05-24 DIAGNOSIS — E78.5 HYPERLIPIDEMIA, UNSPECIFIED HYPERLIPIDEMIA TYPE: ICD-10-CM

## 2024-05-24 DIAGNOSIS — I10 PRIMARY HYPERTENSION: ICD-10-CM

## 2024-05-24 DIAGNOSIS — I50.42 CHRONIC COMBINED SYSTOLIC AND DIASTOLIC HEART FAILURE: ICD-10-CM

## 2024-05-24 DIAGNOSIS — I49.5 SSS (SICK SINUS SYNDROME): Primary | ICD-10-CM

## 2024-05-24 DIAGNOSIS — I10 PRIMARY HYPERTENSION: Primary | ICD-10-CM

## 2024-05-24 DIAGNOSIS — I42.8 NONISCHEMIC CARDIOMYOPATHY: ICD-10-CM

## 2024-05-24 PROCEDURE — 99214 OFFICE O/P EST MOD 30 MIN: CPT | Mod: S$PBB,,,

## 2024-05-24 PROCEDURE — 99214 OFFICE O/P EST MOD 30 MIN: CPT | Mod: PBBFAC,27 | Performed by: INTERNAL MEDICINE

## 2024-05-24 PROCEDURE — 99999 PR PBB SHADOW E&M-EST. PATIENT-LVL IV: CPT | Mod: PBBFAC,,, | Performed by: INTERNAL MEDICINE

## 2024-05-24 PROCEDURE — 99215 OFFICE O/P EST HI 40 MIN: CPT | Mod: S$PBB,,, | Performed by: INTERNAL MEDICINE

## 2024-05-24 PROCEDURE — 99999 PR PBB SHADOW E&M-EST. PATIENT-LVL V: CPT | Mod: PBBFAC,,,

## 2024-05-24 PROCEDURE — 99215 OFFICE O/P EST HI 40 MIN: CPT | Mod: PBBFAC

## 2024-05-24 RX ORDER — METOPROLOL SUCCINATE 100 MG/1
100 TABLET, EXTENDED RELEASE ORAL NIGHTLY
Qty: 30 TABLET | Refills: 11 | Status: SHIPPED | OUTPATIENT
Start: 2024-05-24

## 2024-05-24 NOTE — PROGRESS NOTES
Subjective:       HPI:  Mr. Acosta is a 69 year-old male with stage C HFrEF (25-30%), sick sinus syndrome s/p PPM, nonischemic CMP with PPM upgraded to ICD in 2021, hypertension, and diabetes mellitus who was referred by our colleague Dr. Ajay Azul for evaluation and management of his CHF. This is his 2nd visit with me. Since his 1st visit, he had 3 episodes of symptomatic hypotension (inclduing loss of consciousness), all occurring in the evening (~6 pm).  Today he feels well. Clinically reports NYHA class II symptoms. No PND or orthopnea. No recent HF admissions.  His current HF regimen includes; Entresto 97/103 mg BID, carvedilol 25 mg BID, spironolactone 25 mg daily and Jardiance 10 mg daily.      2D Echo with CFD done in 10/2023    Left Ventricle: The left ventricle is normal in size. Ventricular mass is normal. Normal wall thickness. Global hypokinesis present. Septal motion is consistent with pacing. There is severely reduced systolic function with a visually estimated ejection fraction of 25 - 30%.    Left Atrium: There is an aneurysm along the interatrial septum.    Right Ventricle: Moderate right ventricular enlargement. Wall thickness is normal. Right ventricle wall motion  is normal. Systolic function is borderline low. Pacemaker lead present in the ventricle.    Right Atrium: Right atrium is moderately dilated.    Tricuspid Valve: There is moderate regurgitation.    Pulmonary Artery: No pulmonary hypertension. The estimated pulmonary artery systolic pressure is 30 mmHg.    IVC/SVC: Elevated venous pressure at 15 mmHg.    Pericardium: There is a trivial circumferential effusion.    Technically challenging study.        Past Medical History:   Diagnosis Date    Chronic combined systolic and diastolic heart failure 11/14/2023    Diabetes mellitus     Gout, unspecified     Heart disease     High cholesterol     Insomnia     Systolic dysfunction      No past surgical history on file.    Review of Systems  "  Constitutional: Negative. Negative for chills, decreased appetite, diaphoresis, fever, malaise/fatigue, night sweats, weight gain and weight loss.   Eyes: Negative.    Cardiovascular:  Positive for dyspnea on exertion. Negative for chest pain, claudication, cyanosis, irregular heartbeat, leg swelling, near-syncope, orthopnea, palpitations, paroxysmal nocturnal dyspnea and syncope.   Respiratory:  Negative for cough, hemoptysis and shortness of breath.    Endocrine: Negative.    Hematologic/Lymphatic: Negative.    Skin:  Negative for color change, dry skin and nail changes.   Musculoskeletal: Negative.    Gastrointestinal: Negative.    Genitourinary: Negative.    Neurological:  Negative for weakness.       Objective:   Blood pressure 135/89, pulse 66, height 5' 6" (1.676 m), weight 91 kg (200 lb 9.9 oz).body mass index is 32.38 kg/m².  Physical Exam  Vitals reviewed.   Constitutional:       Appearance: He is well-developed.      Comments: /89 (BP Location: Left arm, Patient Position: Sitting, BP Method: Medium (Automatic))   Pulse 66   Ht 5' 6" (1.676 m)   Wt 91 kg (200 lb 9.9 oz)   BMI 32.38 kg/m²      HENT:      Head: Normocephalic.   Neck:      Vascular: No carotid bruit or JVD.   Cardiovascular:      Rate and Rhythm: Regular rhythm.      Chest Wall: PMI is displaced.      Pulses: Normal pulses.      Heart sounds: Normal heart sounds. No murmur heard.  Pulmonary:      Effort: Pulmonary effort is normal.      Breath sounds: Normal breath sounds.   Abdominal:      General: Bowel sounds are normal.      Palpations: Abdomen is soft.   Skin:     General: Skin is warm.   Neurological:      Mental Status: He is alert.       Labs:    Chemistry        Component Value Date/Time     04/03/2024 1137    K 3.6 04/03/2024 1137     04/03/2024 1137    CO2 26 04/03/2024 1137    BUN 17 04/03/2024 1137    CREATININE 1.1 04/03/2024 1137     (H) 04/03/2024 1137        Component Value Date/Time    CALCIUM " "9.2 04/03/2024 1137    ALKPHOS 71 11/14/2023 1210    AST 10 11/14/2023 1210    ALT 11 11/14/2023 1210    BILITOT 1.8 (H) 11/14/2023 1210          Magnesium   Date Value Ref Range Status   11/14/2023 1.9 1.6 - 2.6 mg/dL Final     Lab Results   Component Value Date    WBC 6.69 04/03/2024    HGB 15.5 04/03/2024    HCT 46.6 04/03/2024     04/03/2024     No results found for: "INR", "PROTIME"  BNP   Date Value Ref Range Status   11/14/2023 340 (H) 0 - 99 pg/mL Final     Comment:     Values of less than 100 pg/ml are consistent with non-CHF populations.   09/30/2023 87 0 - 99 pg/mL Final     Comment:     Values of less than 100 pg/ml are consistent with non-CHF populations.   09/29/2023 108 (H) 0 - 99 pg/mL Final     Comment:     Values of less than 100 pg/ml are consistent with non-CHF populations.       Assessment:      1. SSS (sick sinus syndrome)    2. Chronic combined systolic and diastolic heart failure    3. Nonischemic cardiomyopathy    4. ICD (implantable cardioverter-defibrillator) in place    5. DM type 2 causing complication    6. Primary hypertension    7. Hyperlipidemia, unspecified hyperlipidemia type        Plan:   Stage C HFrEF. NYHA class II symptoms. Euvolemic on exam. Owing to these episodes of symptomatic hypotension, will switch his carvedilol to metoprolol succinate 100 mg to take at bedtime.   Continue full dose of Entresto, spironolactone and jardiance.  Also checking a lipid panel per patient request (he is currently on Rosuvastatin 5 mg).  Recommend 2 gram sodium restriction and 1500cc fluid restriction.  Encourage physical activity with graded exercise program.  Requested patient to weigh themselves daily, and to notify us if their weight increases by more than 3 lbs in 1 day or 5 lbs in 1 week.   RTC in 6 months with Echo to reevaluate his LV function (6 months of full GDMT)     Katya Lai MD          "

## 2024-05-24 NOTE — PATIENT INSTRUCTIONS
Stop taking carvedilol and start Metoprolol succinate 100 mg at bedtime ( may take 1st dose tonight)

## 2024-05-24 NOTE — PROGRESS NOTES
CHIEF COMPLAINT     Chief Complaint   Patient presents with    Hypertension       HPI     Ramesh Acosta is a 69 y.o. male who presents for xxx today.    PCP is Baldemar Savage DO, patient is known to me. Through a series of miscommunications pt was mistakenly taking 50 mg coreg BID. He had syncopal episode and stopped taking it all together. Her then had elevated pressure which led him to get evaluated in the ER and was restarted on coreg 25 BID. Pt reports currently taking 25 mg carvedilol BID. BP has been in range, and is good at today's visit. Pt will see cardiologist later today.    Past Medical History:  Past Medical History:   Diagnosis Date    Chronic combined systolic and diastolic heart failure 11/14/2023    Diabetes mellitus     Gout, unspecified     Heart disease     High cholesterol     Insomnia     Systolic dysfunction        Home Medications:  Prior to Admission medications    Medication Sig Start Date End Date Taking? Authorizing Provider   carvediloL (COREG) 12.5 MG tablet Take 2 tablets (25 mg total) by mouth 2 (two) times daily with meals. 10/10/23  Yes Ajay Azul MD   colchicine, gout, (COLCRYS) 0.6 mg tablet Take 0.6 mg by mouth 2 (two) times daily. 5/23/23  Yes Provider, Historical   empagliflozin (JARDIANCE) 10 mg tablet Take 1 tablet (10 mg total) by mouth once daily. 10/12/23  Yes Ajay Azul MD   ergocalciferol (ERGOCALCIFEROL) 50,000 unit Cap Take 1 capsule (50,000 Units total) by mouth every 7 days. for 12 doses 4/9/24 6/26/24 Yes Seema Au, ADARSH   fluticasone propionate (FLONASE) 50 mcg/actuation nasal spray 1 spray by Each Nostril route once daily.   Yes Provider, Historical   furosemide (LASIX) 20 MG tablet Take 1 tablet (20 mg total) by mouth once daily. 10/12/23  Yes Ajay Azul MD   hydrocortisone 2.5 % ointment Apply 20 g topically 2 (two) times daily. 5/19/23  Yes Provider, Historical   hydrOXYzine HCL (ATARAX) 25 MG tablet TAKE 1  "TABLET BY MOUTH EVERY DAY AT NIGHT 4/25/24  Yes Joaquin Polanco MD   insulin (LANTUS SOLOSTAR U-100 INSULIN) glargine 100 units/mL SubQ pen Inject 10 Units into the skin every evening. 3/6/24 3/6/25 Yes Baldemar Savage,    levocetirizine (XYZAL) 5 MG tablet Take 5 mg by mouth every evening.   Yes Provider, Historical   pen needle, diabetic (BD ULTRA-FINE SHORT PEN NEEDLE) 31 gauge x 5/16" Ndle 1 each by Misc.(Non-Drug; Combo Route) route nightly. 2/22/24  Yes Baldemar Savage,    rosuvastatin (CRESTOR) 5 MG tablet Take 1 tablet (5 mg total) by mouth once daily. 10/12/23  Yes Star Vazquez MD   sacubitriL-valsartan (ENTRESTO)  mg per tablet Take 1 tablet by mouth 2 (two) times daily. 10/12/23  Yes Ajay Azul MD   spironolactone (ALDACTONE) 25 MG tablet Take 1 tablet (25 mg total) by mouth once daily. 11/14/23  Yes Katya Lai MD   tadalafiL (CIALIS) 20 MG Tab Take 1 tablet (20 mg total) by mouth once daily. 11/20/23 11/19/24 Yes Jordan King MD   triamcinolone acetonide 0.1% (KENALOG) 0.1 % cream Apply twice daily to affected area 2/21/24  Yes Mary Gonzalez MD       Review of Systems:  Review of Systems   Constitutional: Negative.    Respiratory: Negative.  Negative for shortness of breath.    Cardiovascular: Negative.  Negative for chest pain and palpitations.   Musculoskeletal:  Positive for neck pain.   Neurological:  Negative for headaches.       Health Maintainence:   Immunizations:  Health Maintenance         Date Due Completion Date    Foot Exam Never done ---    RSV Vaccine (Age 60+ and Pregnant patients) (1 - 1-dose 60+ series) Never done ---    TETANUS VACCINE 02/15/2021 2/15/2011    Pneumococcal Vaccines (Age 65+) (2 of 2 - PCV) 12/17/2022 12/17/2021    Shingles Vaccine (2 of 2) 09/12/2023 7/18/2023    COVID-19 Vaccine (5 - 2023-24 season) 02/09/2024 10/9/2023    Lipid Panel 09/19/2024 9/19/2023    PROSTATE-SPECIFIC ANTIGEN 09/26/2024 9/26/2023    " "Hemoglobin A1c 11/06/2024 5/6/2024    Diabetes Urine Screening 05/06/2025 5/6/2024    Eye Exam 05/06/2025 5/6/2024    Low Dose Statin 05/22/2025 5/22/2024    Colorectal Cancer Screening 01/27/2026 1/27/2023             PHYSICAL EXAM     /82   Pulse 82   Ht 5' 6" (1.676 m)   Wt 90.9 kg (200 lb 6.4 oz)   SpO2 98%   BMI 32.35 kg/m²     Physical Exam  Vitals reviewed.   Constitutional:       Appearance: He is well-developed.   HENT:      Head: Normocephalic and atraumatic.   Eyes:      Conjunctiva/sclera: Conjunctivae normal.   Cardiovascular:      Rate and Rhythm: Normal rate.   Pulmonary:      Effort: Pulmonary effort is normal. No respiratory distress.   Skin:     General: Skin is warm and dry.      Findings: No rash.   Neurological:      Mental Status: He is alert and oriented to person, place, and time.      Coordination: Coordination normal.   Psychiatric:         Behavior: Behavior normal.         LABS     Lab Results   Component Value Date    HGBA1C 6.5 (H) 05/06/2024     CMP  Sodium   Date Value Ref Range Status   04/03/2024 141 136 - 145 mmol/L Final     Potassium   Date Value Ref Range Status   04/03/2024 3.6 3.5 - 5.1 mmol/L Final     Chloride   Date Value Ref Range Status   04/03/2024 105 95 - 110 mmol/L Final     CO2   Date Value Ref Range Status   04/03/2024 26 23 - 29 mmol/L Final     Glucose   Date Value Ref Range Status   04/03/2024 140 (H) 70 - 110 mg/dL Final     BUN   Date Value Ref Range Status   04/03/2024 17 8 - 23 mg/dL Final     Creatinine   Date Value Ref Range Status   04/03/2024 1.1 0.5 - 1.4 mg/dL Final     Calcium   Date Value Ref Range Status   04/03/2024 9.2 8.7 - 10.5 mg/dL Final     Total Protein   Date Value Ref Range Status   11/14/2023 6.4 6.0 - 8.4 g/dL Final     Albumin   Date Value Ref Range Status   04/03/2024 3.7 3.5 - 5.2 g/dL Final     Total Bilirubin   Date Value Ref Range Status   11/14/2023 1.8 (H) 0.1 - 1.0 mg/dL Final     Comment:     For infants and newborns, " interpretation of results should be based  on gestational age, weight and in agreement with clinical  observations.    Premature Infant recommended reference ranges:  Up to 24 hours.............<8.0 mg/dL  Up to 48 hours............<12.0 mg/dL  3-5 days..................<15.0 mg/dL  6-29 days.................<15.0 mg/dL       Alkaline Phosphatase   Date Value Ref Range Status   11/14/2023 71 55 - 135 U/L Final     AST   Date Value Ref Range Status   11/14/2023 10 10 - 40 U/L Final     ALT   Date Value Ref Range Status   11/14/2023 11 10 - 44 U/L Final     Anion Gap   Date Value Ref Range Status   04/03/2024 10 8 - 16 mmol/L Final     Lab Results   Component Value Date    WBC 6.69 04/03/2024    HGB 15.5 04/03/2024    HCT 46.6 04/03/2024    MCV 92 04/03/2024     04/03/2024     Lab Results   Component Value Date    CHOL 126 09/19/2023     Lab Results   Component Value Date    HDL 50 09/19/2023     Lab Results   Component Value Date    LDLCALC 62.0 (L) 09/19/2023     Lab Results   Component Value Date    TRIG 70 09/19/2023     Lab Results   Component Value Date    CHOLHDL 39.7 09/19/2023     Lab Results   Component Value Date    TSH 2.574 11/14/2023       ASSESSMENT/PLAN   1. Primary hypertension    2. Other depression  -     Ambulatory referral/consult to Psychology; Future; Expected date: 05/31/2024             Eduard Foster NP   Department of Internal Medicine - [facility]  9:41 AM      I spent a total of 32 minutes on the day of the visit.  This includes face to face time and non-face to face time preparing to see the patient (eg, review of tests), obtaining and/or reviewing separately obtained history, documenting clinical information in the electronic or other health record, independently interpreting results and communicating results to the patient/family/caregiver, or care coordinator.    Answers submitted by the patient for this visit:  High Blood Pressure Questionnaire (Submitted on  5/21/2024)  Chief Complaint: Hypertension  Chronicity: recurrent  Onset: more than 1 year ago  Progression since onset: waxing and waning  Condition status: resistant  anxiety: Yes  blurred vision: No  malaise/fatigue: No  orthopnea: No  peripheral edema: Yes  PND: No  sweats: No  Agents associated with hypertension: no associated agents  CAD risks: diabetes mellitus, obesity  Compliance problems: medication side effects  Past treatments: beta blockers, diuretics  Improvement on treatment: moderate

## 2024-05-26 ENCOUNTER — PATIENT MESSAGE (OUTPATIENT)
Dept: INTERNAL MEDICINE | Facility: CLINIC | Age: 69
End: 2024-05-26
Payer: MEDICARE

## 2024-05-27 ENCOUNTER — LAB VISIT (OUTPATIENT)
Dept: LAB | Facility: OTHER | Age: 69
End: 2024-05-27
Attending: INTERNAL MEDICINE
Payer: MEDICARE

## 2024-05-27 DIAGNOSIS — I50.42 CHRONIC COMBINED SYSTOLIC AND DIASTOLIC HEART FAILURE: ICD-10-CM

## 2024-05-27 DIAGNOSIS — E78.5 HYPERLIPIDEMIA, UNSPECIFIED HYPERLIPIDEMIA TYPE: ICD-10-CM

## 2024-05-27 DIAGNOSIS — I42.8 NONISCHEMIC CARDIOMYOPATHY: ICD-10-CM

## 2024-05-27 LAB
ALBUMIN SERPL BCP-MCNC: 3.5 G/DL (ref 3.5–5.2)
ALP SERPL-CCNC: 56 U/L (ref 55–135)
ALT SERPL W/O P-5'-P-CCNC: 15 U/L (ref 10–44)
ANION GAP SERPL CALC-SCNC: 8 MMOL/L (ref 8–16)
AST SERPL-CCNC: 14 U/L (ref 10–40)
BILIRUB SERPL-MCNC: 2.1 MG/DL (ref 0.1–1)
BUN SERPL-MCNC: 13 MG/DL (ref 8–23)
CALCIUM SERPL-MCNC: 8.8 MG/DL (ref 8.7–10.5)
CHLORIDE SERPL-SCNC: 108 MMOL/L (ref 95–110)
CHOLEST SERPL-MCNC: 126 MG/DL (ref 120–199)
CHOLEST/HDLC SERPL: 2.9 {RATIO} (ref 2–5)
CO2 SERPL-SCNC: 24 MMOL/L (ref 23–29)
CREAT SERPL-MCNC: 0.9 MG/DL (ref 0.5–1.4)
EST. GFR  (NO RACE VARIABLE): >60 ML/MIN/1.73 M^2
GLUCOSE SERPL-MCNC: 107 MG/DL (ref 70–110)
HDLC SERPL-MCNC: 44 MG/DL (ref 40–75)
HDLC SERPL: 34.9 % (ref 20–50)
LDLC SERPL CALC-MCNC: 70.6 MG/DL (ref 63–159)
NONHDLC SERPL-MCNC: 82 MG/DL
POTASSIUM SERPL-SCNC: 4.1 MMOL/L (ref 3.5–5.1)
PROT SERPL-MCNC: 6.1 G/DL (ref 6–8.4)
SODIUM SERPL-SCNC: 140 MMOL/L (ref 136–145)
TRIGL SERPL-MCNC: 57 MG/DL (ref 30–150)

## 2024-05-27 PROCEDURE — 36415 COLL VENOUS BLD VENIPUNCTURE: CPT | Performed by: INTERNAL MEDICINE

## 2024-05-27 PROCEDURE — 80061 LIPID PANEL: CPT | Performed by: INTERNAL MEDICINE

## 2024-05-27 PROCEDURE — 80053 COMPREHEN METABOLIC PANEL: CPT | Performed by: INTERNAL MEDICINE

## 2024-05-27 RX ORDER — HYDROXYZINE HYDROCHLORIDE 25 MG/1
25 TABLET, FILM COATED ORAL NIGHTLY
Qty: 20 TABLET | Refills: 0 | Status: SHIPPED | OUTPATIENT
Start: 2024-05-27

## 2024-05-27 RX ORDER — ROSUVASTATIN CALCIUM 5 MG/1
5 TABLET, COATED ORAL DAILY
Qty: 90 TABLET | Refills: 2 | Status: SHIPPED | OUTPATIENT
Start: 2024-05-27

## 2024-05-29 NOTE — TELEPHONE ENCOUNTER
This whole issue was addressed on 5/17 when I entered the following notes which I believe I forwarded to dr Lai's team.  Meanwhile the pt was changed to metoprolol by dr Lai.     Mary Chaidez RN  Me     LL    5/17/24  4:15 PM  Note  Pt updated on the fact that is care is now under dr Lai. I told him to keep on checking bp and go back to ER prn. He verbalized understanding.   Message forwarded to dr Lai's team to handle.        Me     LL    5/17/24  4:12 PM  Note  ----- Message from Ghislaine Flores RN sent at 5/17/2024  1:33 PM CDT -----  Regarding: BP low/ high  Spoke w. pt today following yesterday's messages and ER visit. Pt is now discharged. Unable to get records fr [place] apparently due to name discrepancy.   Apparently what happened is that pt stated that he was told to double his dose of carvedilol by dr Lai. He was taking at the time 25 mg bid (12.5 x 2). So he started taking 50 mg bid (12.5 x 4 bid) and his pressure was low including a syncope episode. At the time of the ER visit he had stopped his carvedilol altogether which explained the high readings. His Am BP is 140/110 and he intends on restarting his carvedilol as 25mg bid. I will call him back to schedule a f/u appt after I clarify whether he is followed by dr Lai or dr Azul or both. He verbalized understanding.

## 2024-06-03 ENCOUNTER — PATIENT MESSAGE (OUTPATIENT)
Dept: TRANSPLANT | Facility: CLINIC | Age: 69
End: 2024-06-03
Payer: MEDICARE

## 2024-06-10 ENCOUNTER — PATIENT MESSAGE (OUTPATIENT)
Dept: INTERNAL MEDICINE | Facility: CLINIC | Age: 69
End: 2024-06-10
Payer: MEDICARE

## 2024-06-11 ENCOUNTER — CLINICAL SUPPORT (OUTPATIENT)
Dept: CARDIOLOGY | Facility: HOSPITAL | Age: 69
End: 2024-06-11
Payer: MEDICARE

## 2024-06-11 DIAGNOSIS — Z95.810 PRESENCE OF AUTOMATIC (IMPLANTABLE) CARDIAC DEFIBRILLATOR: ICD-10-CM

## 2024-06-11 DIAGNOSIS — I50.42 CHRONIC COMBINED SYSTOLIC (CONGESTIVE) AND DIASTOLIC (CONGESTIVE) HEART FAILURE: ICD-10-CM

## 2024-06-11 DIAGNOSIS — I42.9 CARDIOMYOPATHY, UNSPECIFIED: ICD-10-CM

## 2024-06-19 ENCOUNTER — PATIENT MESSAGE (OUTPATIENT)
Dept: INTERNAL MEDICINE | Facility: CLINIC | Age: 69
End: 2024-06-19
Payer: MEDICARE

## 2024-06-19 DIAGNOSIS — F41.9 ANXIETY: Primary | ICD-10-CM

## 2024-06-20 ENCOUNTER — PATIENT MESSAGE (OUTPATIENT)
Dept: TRANSPLANT | Facility: CLINIC | Age: 69
End: 2024-06-20
Payer: MEDICARE

## 2024-06-21 RX ORDER — SACUBITRIL AND VALSARTAN 97; 103 MG/1; MG/1
1 TABLET, FILM COATED ORAL 2 TIMES DAILY
Qty: 60 TABLET | Refills: 6 | Status: SHIPPED | OUTPATIENT
Start: 2024-06-21

## 2024-07-03 ENCOUNTER — PATIENT MESSAGE (OUTPATIENT)
Dept: INTERNAL MEDICINE | Facility: CLINIC | Age: 69
End: 2024-07-03
Payer: MEDICARE

## 2024-07-03 NOTE — TELEPHONE ENCOUNTER
Joaquin alarcon MD  Weeks Baldemar ANDRADE Staff14 minutes ago (12:40 PM)       Yes recommend additional covid vaccine now and again in fall when new vaccines comes out

## 2024-07-12 RX ORDER — HYDROXYZINE HYDROCHLORIDE 25 MG/1
25 TABLET, FILM COATED ORAL NIGHTLY
Qty: 20 TABLET | Refills: 0 | Status: SHIPPED | OUTPATIENT
Start: 2024-07-12

## 2024-07-12 NOTE — TELEPHONE ENCOUNTER
Refill Routing Note   Medication(s) are not appropriate for processing by [facility] for the following reason(s):        Outside of protocol    [facility] action(s):  Route               Appointments  past 12m or future 3m with PCP    Date Provider   Last Visit   9/26/2023 Baldemar Savage,    Next Visit   Visit date not found Baldemar Savage, DO   ED visits in past 90 days: 0        Note composed:9:08 AM 07/12/2024

## 2024-07-26 DIAGNOSIS — Z95.810 ICD (IMPLANTABLE CARDIOVERTER-DEFIBRILLATOR) IN PLACE: Primary | ICD-10-CM

## 2024-07-26 DIAGNOSIS — I42.8 NONISCHEMIC CARDIOMYOPATHY: Primary | ICD-10-CM

## 2024-07-29 ENCOUNTER — PATIENT MESSAGE (OUTPATIENT)
Dept: INTERNAL MEDICINE | Facility: CLINIC | Age: 69
End: 2024-07-29
Payer: MEDICARE

## 2024-07-29 NOTE — TELEPHONE ENCOUNTER
No care due was identified.  [facility] Embedded Care Due Messages. Reference number: [id number].   7/29/2024 3:53:39 PM CDT

## 2024-08-05 ENCOUNTER — TELEPHONE (OUTPATIENT)
Dept: ELECTROPHYSIOLOGY | Facility: CLINIC | Age: 69
End: 2024-08-05
Payer: MEDICARE

## 2024-08-06 ENCOUNTER — OFFICE VISIT (OUTPATIENT)
Dept: UROLOGY | Facility: CLINIC | Age: 69
End: 2024-08-06
Payer: MEDICARE

## 2024-08-06 VITALS
SYSTOLIC BLOOD PRESSURE: 110 MMHG | HEART RATE: 81 BPM | WEIGHT: 199.94 LBS | HEIGHT: 66 IN | DIASTOLIC BLOOD PRESSURE: 78 MMHG | BODY MASS INDEX: 32.13 KG/M2

## 2024-08-06 DIAGNOSIS — E11.8 DM TYPE 2 CAUSING COMPLICATION: ICD-10-CM

## 2024-08-06 DIAGNOSIS — N52.01 ERECTILE DYSFUNCTION DUE TO ARTERIAL INSUFFICIENCY: Primary | ICD-10-CM

## 2024-08-06 DIAGNOSIS — I10 PRIMARY HYPERTENSION: ICD-10-CM

## 2024-08-06 PROCEDURE — 99214 OFFICE O/P EST MOD 30 MIN: CPT | Mod: S$PBB,,,

## 2024-08-06 PROCEDURE — 99213 OFFICE O/P EST LOW 20 MIN: CPT | Mod: PBBFAC

## 2024-08-06 PROCEDURE — 99999 PR PBB SHADOW E&M-EST. PATIENT-LVL III: CPT | Mod: PBBFAC,,,

## 2024-08-06 RX ORDER — TADALAFIL 20 MG/1
20 TABLET ORAL DAILY
Qty: 30 TABLET | Refills: 11 | Status: SHIPPED | OUTPATIENT
Start: 2024-08-06 | End: 2025-08-06

## 2024-08-19 RX ORDER — HYDROXYZINE HYDROCHLORIDE 25 MG/1
25 TABLET, FILM COATED ORAL NIGHTLY
Qty: 20 TABLET | Refills: 0 | Status: SHIPPED | OUTPATIENT
Start: 2024-08-19

## 2024-09-09 ENCOUNTER — CLINICAL SUPPORT (OUTPATIENT)
Dept: CARDIOLOGY | Facility: HOSPITAL | Age: 69
End: 2024-09-09
Payer: MEDICARE

## 2024-09-09 DIAGNOSIS — I50.42 CHRONIC COMBINED SYSTOLIC (CONGESTIVE) AND DIASTOLIC (CONGESTIVE) HEART FAILURE: ICD-10-CM

## 2024-09-09 DIAGNOSIS — I42.9 CARDIOMYOPATHY, UNSPECIFIED: ICD-10-CM

## 2024-09-09 DIAGNOSIS — Z95.810 PRESENCE OF AUTOMATIC (IMPLANTABLE) CARDIAC DEFIBRILLATOR: ICD-10-CM

## 2024-09-10 ENCOUNTER — CLINICAL SUPPORT (OUTPATIENT)
Dept: CARDIOLOGY | Facility: HOSPITAL | Age: 69
End: 2024-09-10
Attending: INTERNAL MEDICINE
Payer: MEDICARE

## 2024-09-10 DIAGNOSIS — Z95.810 PRESENCE OF AUTOMATIC (IMPLANTABLE) CARDIAC DEFIBRILLATOR: ICD-10-CM

## 2024-09-10 DIAGNOSIS — I42.9 CARDIOMYOPATHY, UNSPECIFIED: ICD-10-CM

## 2024-09-10 DIAGNOSIS — I50.42 CHRONIC COMBINED SYSTOLIC (CONGESTIVE) AND DIASTOLIC (CONGESTIVE) HEART FAILURE: ICD-10-CM

## 2024-09-10 PROCEDURE — 93295 DEV INTERROG REMOTE 1/2/MLT: CPT | Mod: ,,, | Performed by: INTERNAL MEDICINE

## 2024-09-11 LAB
OHS CV AF BURDEN PERCENT: < 1
OHS CV DC REMOTE DEVICE TYPE: NORMAL
OHS CV RV PACING PERCENT: 0.12 %

## 2024-09-12 NOTE — PROGRESS NOTES
Mr. Acosta is a patient of Dr. Smith and was last seen in clinic 9/12/2023.      Subjective:   Patient ID:  Ramesh Acosta is a 69 y.o. male who presents for follow up of ICD  .     HPI:    Mr. Acosta is a 69 y.o. male with SSS s/p PPM (s/p ICD upgrade 2021), NICM, HTN, DM here for annual follow up.     Background:    Referring Cardiologist: Ajay Azul MD    Mr. Acosta has a hx of sick sinus syndrome s/p PPM, nonischemic CMP with PPM upgraded to ICD in 2021, hypertension, and diabetes mellitus. His PPM was originally implanted in 1984 for syncope. He reports he had prior lead revisions due to a lead fracture and then had an episode of a lead erosion after generator change resulting in system extraction. He was diagnosed with heart failure in 10/2020 with a LVEF of 20%. His device was upgraded to an ICD (right sided). Recent ECHO in June of 2023 noted a LVEF of 40%. He recently moved back to [place] and is here to establish care for his ICD. He feels well and is without complaints.    9/12/2023:   In-clinic ICD check notes stable lead parameters with 3.6% RA and no RV pacing. No arrhythmias. Estimated battery longevity is 10 years.  In-clinic ECG is normal sinus rhythm with a narrow QRS and normal intervals.  In summary, Mr. Acosta is a pleasant 68 year-old man with sick sinus syndrome s/p PPM, nonischemic CMP with PPM upgraded to ICD, hypertension, and diabetes mellitus. Previous PPM was left sided but he had a pocket erosion followed by extraction. ICD is right sided and operating normally. Will enroll in remote monitoring clinic. RTC in 1 year.    Update (09/17/2024):    Today he says he is feeling well. No new cardiac complaints. No worsening LUJAN, CP, palps, LH, syncope reported.    Device Interrogation (9/17/2024) reveals an intrinsic SR with stable lead and device function. No arrhythmias or treated episodes were noted.  He paces 13.4% in the RA and 0.2% in the RV. Estimated battery longevity 8.9  years.     I have personally reviewed the patient's EKG today, which shows sinus rhythm with 1st deg AVB at 85bpm. CA interval is 250. QRS is 98. QTc is 464.    Relevant Cardiac Test Results:    2D Echo (10/9/2023):    Left Ventricle: The left ventricle is normal in size. Ventricular mass is normal. Normal wall thickness. Global hypokinesis present. Septal motion is consistent with pacing. There is severely reduced systolic function with a visually estimated ejection fraction of 25 - 30%.    Left Atrium: There is an aneurysm along the interatrial septum.    Right Ventricle: Moderate right ventricular enlargement. Wall thickness is normal. Right ventricle wall motion  is normal. Systolic function is borderline low. Pacemaker lead present in the ventricle.    Right Atrium: Right atrium is moderately dilated.    Tricuspid Valve: There is moderate regurgitation.    Pulmonary Artery: No pulmonary hypertension. The estimated pulmonary artery systolic pressure is 30 mmHg.    IVC/SVC: Elevated venous pressure at 15 mmHg.    Pericardium: There is a trivial circumferential effusion.    Technically challenging study.    Current Outpatient Medications   Medication Sig    colchicine, gout, (COLCRYS) 0.6 mg tablet Take 0.6 mg by mouth 2 (two) times daily.    empagliflozin (JARDIANCE) 10 mg tablet Take 1 tablet (10 mg total) by mouth once daily.    fluticasone propionate (FLONASE) 50 mcg/actuation nasal spray 1 spray by Each Nostril route once daily.    furosemide (LASIX) 20 MG tablet Take 1 tablet (20 mg total) by mouth once daily.    hydrocortisone 2.5 % ointment Apply 20 g topically 2 (two) times daily.    hydrOXYzine HCL (ATARAX) 25 MG tablet TAKE 1 TABLET BY MOUTH EVERY DAY AT NIGHT    insulin (LANTUS SOLOSTAR U-100 INSULIN) glargine 100 units/mL SubQ pen Inject 10 Units into the skin every evening.    levocetirizine (XYZAL) 5 MG tablet Take 5 mg by mouth every evening.    metoprolol succinate (TOPROL-XL) 100 MG 24 hr tablet  "Take 1 tablet (100 mg total) by mouth every evening.    pen needle, diabetic (BD ULTRA-FINE SHORT PEN NEEDLE) 31 gauge x 5/16" Ndle 1 each by Misc.(Non-Drug; Combo Route) route nightly.    rosuvastatin (CRESTOR) 5 MG tablet Take 1 tablet (5 mg total) by mouth once daily.    sacubitriL-valsartan (ENTRESTO)  mg per tablet Take 1 tablet by mouth 2 (two) times daily.    spironolactone (ALDACTONE) 25 MG tablet Take 1 tablet (25 mg total) by mouth once daily.    tadalafiL (CIALIS) 20 MG Tab Take 1 tablet (20 mg total) by mouth once daily.    triamcinolone acetonide 0.1% (KENALOG) 0.1 % cream Apply twice daily to affected area     No current facility-administered medications for this visit.       Review of Systems   Constitutional: Negative for malaise/fatigue.   Cardiovascular:  Negative for chest pain, dyspnea on exertion, irregular heartbeat, leg swelling and palpitations.   Respiratory:  Negative for shortness of breath.    Hematologic/Lymphatic: Negative for bleeding problem.   Skin:  Negative for rash.   Musculoskeletal:  Negative for myalgias.   Gastrointestinal:  Negative for hematemesis, hematochezia and nausea.   Genitourinary:  Negative for hematuria.   Neurological:  Negative for light-headedness.   Psychiatric/Behavioral:  Negative for altered mental status.    Allergic/Immunologic: Negative for persistent infections.       Objective:          /79   Pulse 85   Ht 5' 6" (1.676 m)   Wt 89.9 kg (198 lb 3.1 oz)   BMI 31.99 kg/m²     Physical Exam  Vitals and nursing note reviewed.   Constitutional:       Appearance: Normal appearance. He is well-developed.   HENT:      Head: Normocephalic.      Nose: Nose normal.   Eyes:      Pupils: Pupils are equal, round, and reactive to light.   Cardiovascular:      Rate and Rhythm: Normal rate and regular rhythm.   Pulmonary:      Effort: No respiratory distress.   Chest:      Comments: Device to RUCW. Incision and pocket in good repair.    Musculoskeletal:    "      General: Normal range of motion.   Skin:     General: Skin is warm and dry.      Findings: No erythema.   Neurological:      Mental Status: He is alert and oriented to person, place, and time.   Psychiatric:         Speech: Speech normal.         Behavior: Behavior normal.           Lab Results   Component Value Date     05/27/2024    K 4.1 05/27/2024    MG 1.9 11/14/2023    BUN 13 05/27/2024    CREATININE 0.9 05/27/2024    ALT 15 05/27/2024    AST 14 05/27/2024    HGB 15.5 04/03/2024    HCT 46.6 04/03/2024    HCT 43 09/29/2023    TSH 2.574 11/14/2023    LDLCALC 70.6 05/27/2024             Assessment:     1. ICD (implantable cardioverter-defibrillator) in place    2. Primary hypertension    3. Nonischemic cardiomyopathy    4. Chronic combined systolic and diastolic heart failure      Plan:     In summary, Mr. Acosta is a 69 y.o. male with SSS s/p PPM (s/p ICD upgrade 2021), NICM, HTN, DM here for annual follow up.   Mr. Acosta is doing well from a device perspective with stable lead and device function. Right-sided device (left previously extracted due to infection).   No sustained arrhythmia noted. No treated episodes. No RV pacing. No worsening CHF symptoms. Follows with HTS.    Continue current medication regimen and device settings.   Follow up in device clinic as scheduled.   Follow up in EP clinic in 1 year, sooner as needed.     *A copy of this note has been sent to Dr. Smith*    Follow up in about 1 year (around 9/17/2025).    ------------------------------------------------------------------    KERWIN Nash, NP-C  Cardiac Electrophysiology

## 2024-09-17 ENCOUNTER — HOSPITAL ENCOUNTER (OUTPATIENT)
Dept: CARDIOLOGY | Facility: CLINIC | Age: 69
Discharge: HOME | End: 2024-09-17
Payer: MEDICARE

## 2024-09-17 ENCOUNTER — CLINICAL SUPPORT (OUTPATIENT)
Dept: CARDIOLOGY | Facility: HOSPITAL | Age: 69
End: 2024-09-17
Attending: INTERNAL MEDICINE
Payer: MEDICARE

## 2024-09-17 ENCOUNTER — OFFICE VISIT (OUTPATIENT)
Dept: ELECTROPHYSIOLOGY | Facility: CLINIC | Age: 69
End: 2024-09-17
Payer: MEDICARE

## 2024-09-17 VITALS
BODY MASS INDEX: 31.85 KG/M2 | SYSTOLIC BLOOD PRESSURE: 129 MMHG | WEIGHT: 198.19 LBS | DIASTOLIC BLOOD PRESSURE: 79 MMHG | HEART RATE: 85 BPM | HEIGHT: 66 IN

## 2024-09-17 DIAGNOSIS — Z95.810 ICD (IMPLANTABLE CARDIOVERTER-DEFIBRILLATOR) IN PLACE: ICD-10-CM

## 2024-09-17 DIAGNOSIS — I10 PRIMARY HYPERTENSION: ICD-10-CM

## 2024-09-17 DIAGNOSIS — Z95.810 ICD (IMPLANTABLE CARDIOVERTER-DEFIBRILLATOR) IN PLACE: Primary | ICD-10-CM

## 2024-09-17 DIAGNOSIS — I42.8 NONISCHEMIC CARDIOMYOPATHY: ICD-10-CM

## 2024-09-17 DIAGNOSIS — I50.42 CHRONIC COMBINED SYSTOLIC AND DIASTOLIC HEART FAILURE: ICD-10-CM

## 2024-09-17 LAB
OHS QRS DURATION: 98 MS
OHS QTC CALCULATION: 464 MS

## 2024-09-17 PROCEDURE — 93280 PM DEVICE PROGR EVAL DUAL: CPT

## 2024-09-17 PROCEDURE — 93005 ELECTROCARDIOGRAM TRACING: CPT | Mod: PBBFAC | Performed by: INTERNAL MEDICINE

## 2024-09-17 PROCEDURE — 99999 PR PBB SHADOW E&M-EST. PATIENT-LVL III: CPT | Mod: PBBFAC,,, | Performed by: NURSE PRACTITIONER

## 2024-09-17 PROCEDURE — 93010 ELECTROCARDIOGRAM REPORT: CPT | Mod: S$PBB,,, | Performed by: INTERNAL MEDICINE

## 2024-09-17 PROCEDURE — 99214 OFFICE O/P EST MOD 30 MIN: CPT | Mod: S$PBB,,, | Performed by: NURSE PRACTITIONER

## 2024-09-17 PROCEDURE — 99213 OFFICE O/P EST LOW 20 MIN: CPT | Mod: PBBFAC,25 | Performed by: NURSE PRACTITIONER

## 2024-09-27 RX ORDER — HYDROXYZINE HYDROCHLORIDE 25 MG/1
25 TABLET, FILM COATED ORAL NIGHTLY
Qty: 20 TABLET | Refills: 0 | Status: SHIPPED | OUTPATIENT
Start: 2024-09-27

## 2024-11-06 RX ORDER — HYDROXYZINE HYDROCHLORIDE 25 MG/1
25 TABLET, FILM COATED ORAL NIGHTLY
Qty: 20 TABLET | Refills: 0 | Status: SHIPPED | OUTPATIENT
Start: 2024-11-06

## 2024-12-09 ENCOUNTER — CLINICAL SUPPORT (OUTPATIENT)
Dept: CARDIOLOGY | Facility: HOSPITAL | Age: 69
End: 2024-12-09
Payer: MEDICARE

## 2024-12-09 DIAGNOSIS — I50.42 CHRONIC COMBINED SYSTOLIC (CONGESTIVE) AND DIASTOLIC (CONGESTIVE) HEART FAILURE: ICD-10-CM

## 2024-12-09 DIAGNOSIS — F32.89 OTHER DEPRESSION: ICD-10-CM

## 2024-12-09 DIAGNOSIS — F41.9 ANXIETY: ICD-10-CM

## 2024-12-09 DIAGNOSIS — Z95.810 PRESENCE OF AUTOMATIC (IMPLANTABLE) CARDIAC DEFIBRILLATOR: ICD-10-CM

## 2024-12-09 DIAGNOSIS — I42.9 CARDIOMYOPATHY, UNSPECIFIED: ICD-10-CM

## 2024-12-09 RX ORDER — HYDROXYZINE HYDROCHLORIDE 25 MG/1
25 TABLET, FILM COATED ORAL NIGHTLY
Qty: 20 TABLET | Refills: 0 | Status: SHIPPED | OUTPATIENT
Start: 2024-12-09

## 2024-12-09 NOTE — TELEPHONE ENCOUNTER
Refill Encounter    PCP Visits: Recent Visits  No visits were found meeting these conditions.  Showing recent visits within past 360 days and meeting all other requirements  Future Appointments  No visits were found meeting these conditions.  Showing future appointments within next 720 days and meeting all other requirements     Last 3 Blood Pressure:   BP Readings from Last 3 Encounters:   09/17/24 129/79   08/06/24 110/78   05/24/24 135/89     Preferred Pharmacy:   [facility]/pharmacy #[id number] - [facility] [address] [address]  Phone: [phone] Fax: [phone]    [facility] [place] - [facility] [address] [facility].  [address] [facility].  [facility] [address]  Phone: [phone] Fax: [phone]    [facility] [address] [place] [address] [address] [place] [address]  [address] [place] [address]  Phone: [phone] Fax: [phone]    Requested RX:  Requested Prescriptions     Pending Prescriptions Disp Refills    hydrOXYzine HCL (ATARAX) 25 MG tablet [Pharmacy Med Name: HYDROXYZINE HCL 25 MG TABLET] 20 tablet 0     Sig: TAKE 1 TABLET BY MOUTH EVERY DAY AT NIGHT      RX Route: Normal

## 2024-12-10 ENCOUNTER — CLINICAL SUPPORT (OUTPATIENT)
Dept: CARDIOLOGY | Facility: HOSPITAL | Age: 69
End: 2024-12-10
Attending: INTERNAL MEDICINE
Payer: MEDICARE

## 2024-12-10 DIAGNOSIS — Z95.810 PRESENCE OF AUTOMATIC (IMPLANTABLE) CARDIAC DEFIBRILLATOR: ICD-10-CM

## 2024-12-10 DIAGNOSIS — I50.42 CHRONIC COMBINED SYSTOLIC (CONGESTIVE) AND DIASTOLIC (CONGESTIVE) HEART FAILURE: ICD-10-CM

## 2024-12-10 DIAGNOSIS — I42.9 CARDIOMYOPATHY, UNSPECIFIED: ICD-10-CM

## 2024-12-10 PROCEDURE — 93295 DEV INTERROG REMOTE 1/2/MLT: CPT | Mod: ,,, | Performed by: INTERNAL MEDICINE

## 2024-12-13 LAB
OHS CV AF BURDEN PERCENT: < 1
OHS CV DC REMOTE DEVICE TYPE: NORMAL
OHS CV RV PACING PERCENT: 0.22 %

## 2025-01-17 DIAGNOSIS — I50.42 CHRONIC COMBINED SYSTOLIC AND DIASTOLIC HEART FAILURE: ICD-10-CM

## 2025-01-17 RX ORDER — SPIRONOLACTONE 25 MG/1
25 TABLET ORAL
Qty: 90 TABLET | Refills: 3 | Status: SHIPPED | OUTPATIENT
Start: 2025-01-17

## 2025-01-21 ENCOUNTER — PATIENT MESSAGE (OUTPATIENT)
Dept: ADMINISTRATIVE | Facility: HOSPITAL | Age: 70
End: 2025-01-21
Payer: MEDICARE

## 2025-01-22 ENCOUNTER — PATIENT OUTREACH (OUTPATIENT)
Dept: ADMINISTRATIVE | Facility: HOSPITAL | Age: 70
End: 2025-01-22
Payer: MEDICARE

## 2025-01-22 DIAGNOSIS — E11.9 TYPE 2 DIABETES MELLITUS WITHOUT COMPLICATION, WITHOUT LONG-TERM CURRENT USE OF INSULIN: Primary | ICD-10-CM

## 2025-01-24 DIAGNOSIS — F41.9 ANXIETY: ICD-10-CM

## 2025-01-24 RX ORDER — HYDROXYZINE HYDROCHLORIDE 25 MG/1
25 TABLET, FILM COATED ORAL NIGHTLY
Qty: 20 TABLET | Refills: 0 | Status: SHIPPED | OUTPATIENT
Start: 2025-01-24

## 2025-01-24 NOTE — TELEPHONE ENCOUNTER
Refill Encounter  Allergies: Confirmed    PCP Visits: Recent Visits  No visits were found meeting these conditions.  Showing recent visits within past 360 days and meeting all other requirements  Future Appointments  No visits were found meeting these conditions.  Showing future appointments within next 720 days and meeting all other requirements     Last 3 Blood Pressure:   BP Readings from Last 3 Encounters:   09/17/24 129/79   08/06/24 110/78   05/24/24 135/89     Preferred Pharmacy:   [facility]/pharmacy #[id number] - [place], [place] - [address] [address]  Phone: [phone] Fax: [phone]    Requested RX:  Requested Prescriptions     Pending Prescriptions Disp Refills    hydrOXYzine HCL (ATARAX) 25 MG tablet [Pharmacy Med Name: HYDROXYZINE HCL 25 MG TABLET] 20 tablet 0     Sig: TAKE 1 TABLET BY MOUTH EVERY DAY AT NIGHT      RX Route: Normal

## 2025-01-28 ENCOUNTER — OFFICE VISIT (OUTPATIENT)
Dept: INTERNAL MEDICINE | Facility: CLINIC | Age: 70
End: 2025-01-28
Payer: COMMERCIAL

## 2025-01-28 ENCOUNTER — LAB VISIT (OUTPATIENT)
Dept: LAB | Facility: OTHER | Age: 70
End: 2025-01-28
Attending: FAMILY MEDICINE
Payer: COMMERCIAL

## 2025-01-28 VITALS
WEIGHT: 112.44 LBS | SYSTOLIC BLOOD PRESSURE: 126 MMHG | HEART RATE: 83 BPM | HEIGHT: 66 IN | DIASTOLIC BLOOD PRESSURE: 82 MMHG | BODY MASS INDEX: 18.07 KG/M2 | OXYGEN SATURATION: 99 %

## 2025-01-28 DIAGNOSIS — Z12.5 PROSTATE CANCER SCREENING: ICD-10-CM

## 2025-01-28 DIAGNOSIS — I10 PRIMARY HYPERTENSION: ICD-10-CM

## 2025-01-28 DIAGNOSIS — E11.9 TYPE 2 DIABETES MELLITUS WITHOUT COMPLICATION, WITHOUT LONG-TERM CURRENT USE OF INSULIN: ICD-10-CM

## 2025-01-28 DIAGNOSIS — E11.8 DM TYPE 2 CAUSING COMPLICATION: Primary | ICD-10-CM

## 2025-01-28 DIAGNOSIS — E11.8 DM TYPE 2 CAUSING COMPLICATION: ICD-10-CM

## 2025-01-28 LAB
ALBUMIN SERPL BCP-MCNC: 3.6 G/DL (ref 3.5–5.2)
ALP SERPL-CCNC: 62 U/L (ref 40–150)
ALT SERPL W/O P-5'-P-CCNC: 17 U/L (ref 10–44)
ANION GAP SERPL CALC-SCNC: 9 MMOL/L (ref 8–16)
AST SERPL-CCNC: 15 U/L (ref 10–40)
BASOPHILS # BLD AUTO: 0.03 K/UL (ref 0–0.2)
BASOPHILS NFR BLD: 0.5 % (ref 0–1.9)
BILIRUB SERPL-MCNC: 1.1 MG/DL (ref 0.1–1)
BUN SERPL-MCNC: 14 MG/DL (ref 8–23)
CALCIUM SERPL-MCNC: 9.1 MG/DL (ref 8.7–10.5)
CHLORIDE SERPL-SCNC: 108 MMOL/L (ref 95–110)
CO2 SERPL-SCNC: 26 MMOL/L (ref 23–29)
COMPLEXED PSA SERPL-MCNC: 1.9 NG/ML (ref 0–4)
CREAT SERPL-MCNC: 1.1 MG/DL (ref 0.5–1.4)
DIFFERENTIAL METHOD BLD: ABNORMAL
EOSINOPHIL # BLD AUTO: 0 K/UL (ref 0–0.5)
EOSINOPHIL NFR BLD: 0.6 % (ref 0–8)
ERYTHROCYTE [DISTWIDTH] IN BLOOD BY AUTOMATED COUNT: 13.3 % (ref 11.5–14.5)
EST. GFR  (NO RACE VARIABLE): >60 ML/MIN/1.73 M^2
ESTIMATED AVG GLUCOSE: 134 MG/DL (ref 68–131)
GLUCOSE SERPL-MCNC: 122 MG/DL (ref 70–110)
HBA1C MFR BLD: 6.3 % (ref 4–5.6)
HCT VFR BLD AUTO: 42.5 % (ref 40–54)
HGB BLD-MCNC: 14.4 G/DL (ref 14–18)
IMM GRANULOCYTES # BLD AUTO: 0.01 K/UL (ref 0–0.04)
IMM GRANULOCYTES NFR BLD AUTO: 0.2 % (ref 0–0.5)
LYMPHOCYTES # BLD AUTO: 1.3 K/UL (ref 1–4.8)
LYMPHOCYTES NFR BLD: 20.4 % (ref 18–48)
MCH RBC QN AUTO: 31.9 PG (ref 27–31)
MCHC RBC AUTO-ENTMCNC: 33.9 G/DL (ref 32–36)
MCV RBC AUTO: 94 FL (ref 82–98)
MONOCYTES # BLD AUTO: 0.4 K/UL (ref 0.3–1)
MONOCYTES NFR BLD: 5.9 % (ref 4–15)
NEUTROPHILS # BLD AUTO: 4.7 K/UL (ref 1.8–7.7)
NEUTROPHILS NFR BLD: 72.4 % (ref 38–73)
NRBC BLD-RTO: 0 /100 WBC
PLATELET # BLD AUTO: 147 K/UL (ref 150–450)
PMV BLD AUTO: 9.4 FL (ref 9.2–12.9)
POTASSIUM SERPL-SCNC: 4.1 MMOL/L (ref 3.5–5.1)
PROT SERPL-MCNC: 6.6 G/DL (ref 6–8.4)
RBC # BLD AUTO: 4.52 M/UL (ref 4.6–6.2)
SODIUM SERPL-SCNC: 143 MMOL/L (ref 136–145)
WBC # BLD AUTO: 6.43 K/UL (ref 3.9–12.7)

## 2025-01-28 PROCEDURE — 83036 HEMOGLOBIN GLYCOSYLATED A1C: CPT | Performed by: FAMILY MEDICINE

## 2025-01-28 PROCEDURE — 85025 COMPLETE CBC W/AUTO DIFF WBC: CPT

## 2025-01-28 PROCEDURE — 99213 OFFICE O/P EST LOW 20 MIN: CPT | Mod: PBBFAC

## 2025-01-28 PROCEDURE — 99999 PR PBB SHADOW E&M-EST. PATIENT-LVL III: CPT | Mod: PBBFAC,,,

## 2025-01-28 PROCEDURE — 80053 COMPREHEN METABOLIC PANEL: CPT

## 2025-01-28 PROCEDURE — 99214 OFFICE O/P EST MOD 30 MIN: CPT | Mod: S$GLB,,,

## 2025-01-28 PROCEDURE — 84153 ASSAY OF PSA TOTAL: CPT

## 2025-01-28 PROCEDURE — 36415 COLL VENOUS BLD VENIPUNCTURE: CPT | Performed by: FAMILY MEDICINE

## 2025-01-28 RX ORDER — SACUBITRIL AND VALSARTAN 97; 103 MG/1; MG/1
1 TABLET, FILM COATED ORAL 2 TIMES DAILY
Qty: 60 TABLET | Refills: 3 | Status: SHIPPED | OUTPATIENT
Start: 2025-01-28

## 2025-01-28 NOTE — PROGRESS NOTES
Internal Medicine Annual Exam       CHIEF COMPLAINT     The patient, Ramesh Acosta, who is a 70 y.o. male presents for an annual exam.    HPI   Pt reports mild GI upset 2-3 mornings per week. He wakes up with gas and discomfort that resolves after a BM. He believes this may be a side effect of one of his meds. Denies pain, blood in stool, n/v, or other red flag symptoms.              Past Medical History:  Past Medical History:   Diagnosis Date    Chronic combined systolic and diastolic heart failure 11/14/2023    Diabetes mellitus     Gout, unspecified     Heart disease     High cholesterol     Insomnia     Systolic dysfunction        History reviewed. No pertinent surgical history.     Family History   Problem Relation Name Age of Onset    No Known Problems Father      Breast cancer Mother          Social History     Socioeconomic History    Marital status:    Tobacco Use    Smoking status: Never     Passive exposure: Never    Smokeless tobacco: Never   Substance and Sexual Activity    Alcohol use: Not Currently    Drug use: Never    Sexual activity: Yes     Partners: Female     Social Drivers of Health     Financial Resource Strain: Low Risk  (1/24/2024)    Overall Financial Resource Strain (CARDIA)     Difficulty of Paying Living Expenses: Not hard at all   Food Insecurity: No Food Insecurity (1/24/2024)    Hunger Vital Sign     Worried About Running Out of Food in the Last Year: Never true     Ran Out of Food in the Last Year: Never true   Transportation Needs: No Transportation Needs (1/24/2024)    PRAPARE - Transportation     Lack of Transportation (Medical): No     Lack of Transportation (Non-Medical): No   Physical Activity: Inactive (1/24/2024)    Exercise Vital Sign     Days of Exercise per Week: 0 days     Minutes of Exercise per Session: 0 min   Stress: No Stress Concern Present (1/24/2024)    [employer] - Occupational Stress Questionnaire     Feeling of  "Stress : Only a little   Housing Stability: Low Risk  (1/24/2024)    Housing Stability Vital Sign     Unable to Pay for Housing in the Last Year: No     Number of Places Lived in the Last Year: 2     Unstable Housing in the Last Year: No        Social History     Tobacco Use   Smoking Status Never    Passive exposure: Never   Smokeless Tobacco Never        Allergies as of 01/28/2025 - Reviewed 01/28/2025   Allergen Reaction Noted    Shellfish containing products Hives 02/21/2024          Home Medications:  Prior to Admission medications    Medication Sig Start Date End Date Taking? Authorizing Provider   colchicine, gout, (COLCRYS) 0.6 mg tablet Take 0.6 mg by mouth 2 (two) times daily as needed. 5/23/23  Yes Provider, Historical   empagliflozin (JARDIANCE) 10 mg tablet Take 1 tablet (10 mg total) by mouth once daily. 7/30/24  Yes Weeks, Baldemar HINES, DO   fluticasone propionate (FLONASE) 50 mcg/actuation nasal spray 1 spray by Each Nostril route once daily.   Yes Provider, Historical   hydrOXYzine HCL (ATARAX) 25 MG tablet TAKE 1 TABLET BY MOUTH EVERY DAY AT NIGHT 1/24/25  Yes Weeks, Baldemar HINES, DO   insulin (LANTUS SOLOSTAR U-100 INSULIN) glargine 100 units/mL SubQ pen Inject 10 Units into the skin every evening. 3/6/24 3/6/25 Yes Weeks, Baldemar HINES, DO   levocetirizine (XYZAL) 5 MG tablet Take 5 mg by mouth every evening.   Yes Provider, Historical   metoprolol succinate (TOPROL-XL) 100 MG 24 hr tablet Take 1 tablet (100 mg total) by mouth every evening. 5/24/24  Yes Katya Lai MD   pen needle, diabetic (BD ULTRA-FINE SHORT PEN NEEDLE) 31 gauge x 5/16" Ndle 1 each by Misc.(Non-Drug; Combo Route) route nightly. 2/22/24  Yes Weeks, Baldemar HINES, DO   rosuvastatin (CRESTOR) 5 MG tablet Take 1 tablet (5 mg total) by mouth once daily. 5/27/24  Yes Weeks, Baldemar HINES, DO   sacubitriL-valsartan (ENTRESTO)  mg per tablet Take 1 tablet by mouth 2 (two) times daily. 6/21/24  Yes Katya Lai MD   spironolactone " "(ALDACTONE) 25 MG tablet TAKE 1 TABLET BY MOUTH EVERY DAY 1/17/25  Yes Katya Lai MD   tadalafiL (CIALIS) 20 MG Tab Take 1 tablet (20 mg total) by mouth once daily. 8/6/24 8/6/25 Yes Kim Willingham NP       Review of Systems:  Review of Systems   Constitutional:  Negative for chills, fever and unexpected weight change.   HENT:  Negative for congestion and sore throat.    Eyes:  Negative for visual disturbance.   Respiratory:  Negative for cough, shortness of breath and wheezing.    Cardiovascular:  Negative for chest pain.   Gastrointestinal:  Negative for abdominal pain, constipation and diarrhea.   Endocrine: Negative for polydipsia and polyuria.   Genitourinary:  Negative for difficulty urinating.   Musculoskeletal: Negative.    Skin: Negative.    Neurological:  Negative for facial asymmetry, speech difficulty and weakness.   Psychiatric/Behavioral:  Negative for sleep disturbance and suicidal ideas. The patient is not nervous/anxious.        Health Maintainence:   Immunizations:  Health Maintenance         Date Due Completion Date    Foot Exam Never done ---    TETANUS VACCINE 02/15/2021 2/15/2011    Pneumococcal Vaccines (Age 50+) (2 of 2 - PCV) 12/17/2022 12/17/2021    Shingles Vaccine (2 of 2) 09/12/2023 7/18/2023    PROSTATE-SPECIFIC ANTIGEN 09/26/2024 9/26/2023    Hemoglobin A1c 11/06/2024 5/6/2024    Diabetes Urine Screening 05/06/2025 5/6/2024    Diabetic Eye Exam 05/06/2025 5/6/2024    Lipid Panel 05/27/2025 5/27/2024    Colorectal Cancer Screening 01/27/2026 1/27/2023    Low Dose Statin 01/28/2026 1/28/2025             PHYSICAL EXAM     /82   Pulse 83   Ht 5' 6" (1.676 m)   Wt 51 kg (112 lb 7 oz)   SpO2 99%   BMI 18.15 kg/m²  Body mass index is 18.15 kg/m².    Physical Exam  Vitals reviewed.   Constitutional:       General: He is not in acute distress.     Appearance: He is well-developed. He is not diaphoretic.   HENT:      Head: Normocephalic and atraumatic.      Nose: Nose normal. "   Eyes:      General:         Right eye: No discharge.         Left eye: No discharge.      Conjunctiva/sclera: Conjunctivae normal.      Pupils: Pupils are equal, round, and reactive to light.   Neck:      Thyroid: No thyromegaly.   Cardiovascular:      Rate and Rhythm: Normal rate and regular rhythm.      Pulses: Normal pulses.      Heart sounds: Normal heart sounds. No murmur heard.  Pulmonary:      Effort: Pulmonary effort is normal. No respiratory distress.      Breath sounds: Normal breath sounds. No wheezing.   Abdominal:      General: Abdomen is flat. There is no distension.      Palpations: Abdomen is soft.   Musculoskeletal:      Cervical back: Neck supple.   Skin:     General: Skin is warm and dry.   Neurological:      Mental Status: He is alert and oriented to person, place, and time.   Psychiatric:         Behavior: Behavior normal.           ASSESSMENT/PLAN     Assessment & Plan              Ramesh was seen today for annual exam.    Diagnoses and all orders for this visit:    DM type 2 causing complication  -     CBC Auto Differential; Future  -     Comprehensive Metabolic Panel; Future    Primary hypertension  -     CBC Auto Differential; Future  -     Comprehensive Metabolic Panel; Future    Prostate cancer screening  -     PSA, Screening; Future           Eduard Foster NP   Department of Internal Medicine - [facility]  1:36 PM

## 2025-01-29 ENCOUNTER — PATIENT MESSAGE (OUTPATIENT)
Dept: INTERNAL MEDICINE | Facility: CLINIC | Age: 70
End: 2025-01-29
Payer: MEDICARE

## 2025-02-24 DIAGNOSIS — Z00.00 ENCOUNTER FOR MEDICARE ANNUAL WELLNESS EXAM: ICD-10-CM

## 2025-02-25 DIAGNOSIS — F41.9 ANXIETY: ICD-10-CM

## 2025-02-25 RX ORDER — HYDROXYZINE HYDROCHLORIDE 25 MG/1
25 TABLET, FILM COATED ORAL NIGHTLY
Qty: 20 TABLET | Refills: 0 | Status: SHIPPED | OUTPATIENT
Start: 2025-02-25

## 2025-02-25 NOTE — TELEPHONE ENCOUNTER
Refill Encounter    PCP Visits: Recent Visits  Date Type Provider Dept   01/28/25 Office Visit Eduard Foster NP [facility] Internal Medicine   05/24/24 Office Visit Eduard Foster NP [facility] Internal Medicine   Showing recent visits within past 360 days and meeting all other requirements  Future Appointments  Date Type Provider Dept   03/24/25 Appointment Epifanio Hairston NP [facility] Internal Medicine   Showing future appointments within next 720 days and meeting all other requirements      Last 3 Blood Pressure:   BP Readings from Last 3 Encounters:   01/28/25 126/82   09/17/24 129/79   08/06/24 110/78     Preferred Pharmacy:   [facility]/pharmacy #[id number] - [facility] [address] [address]  Phone: [phone] Fax: [phone]    [facility] - [facility] [address] [facility]  [address] [place] [address]  Phone: [phone] Fax: [phone]    [facility] [address] [place] [address] [address] [place] [address]  [address] [place] [address]  Phone: [phone] Fax: [phone]    Requested RX:  Requested Prescriptions     Pending Prescriptions Disp Refills    hydrOXYzine HCL (ATARAX) 25 MG tablet [Pharmacy Med Name: HYDROXYZINE HCL 25 MG TABLET] 20 tablet 0     Sig: TAKE 1 TABLET BY MOUTH EVERY DAY AT NIGHT      RX Route: Normal

## 2025-02-28 RX ORDER — ROSUVASTATIN CALCIUM 5 MG/1
5 TABLET, COATED ORAL
Qty: 90 TABLET | Refills: 2 | Status: SHIPPED | OUTPATIENT
Start: 2025-02-28

## 2025-02-28 NOTE — TELEPHONE ENCOUNTER
Refill Encounter  Allergies: Confirmed    PCP Visits: Recent Visits  Annual with KARINA 1/28/25    Future Appointments  No visits were found meeting these conditions.  Showing future appointments within next 720 days and meeting all other requirements     Last 3 Blood Pressure:   BP Readings from Last 3 Encounters:   01/28/25 126/82   09/17/24 129/79   08/06/24 110/78     Preferred Pharmacy:   [facility]/pharmacy #[id number] - [place], [place] - [address] [address]  Phone: [phone] Fax: [phone]    Requested RX:  Requested Prescriptions     Pending Prescriptions Disp Refills    rosuvastatin (CRESTOR) 5 MG tablet [Pharmacy Med Name: ROSUVASTATIN CALCIUM 5 MG TAB] 90 tablet 2     Sig: TAKE 1 TABLET BY MOUTH EVERY DAY      RX Route: Normal

## 2025-02-28 NOTE — TELEPHONE ENCOUNTER
Care Due:                  Date            Visit Type   Department     Provider  --------------------------------------------------------------------------------                                NP -                              PRIMARY      [facility] INTERNAL  Last Visit: 09-      CARE ([facility])   MEDICINE       Baldemar Savage  Next Visit: None Scheduled  None         None Found                                                            Last  Test          Frequency    Reason                     Performed    Due Date  --------------------------------------------------------------------------------    Office Visit  15 months..  empagliflozin, insulin,    09- 12-                             rosuvastatin.............    Lipid Panel.  12 months..  rosuvastatin.............  05- 05-    [employer] Embedded Care Due Messages. Reference number: [id number].   2/28/2025 12:01:28 AM CST

## 2025-03-02 NOTE — TELEPHONE ENCOUNTER
No care due was identified.  Health [facility] Embedded Care Due Messages. Reference number: [id number].   3/02/2025 6:57:29 AM CST

## 2025-03-03 RX ORDER — EMPAGLIFLOZIN 10 MG/1
10 TABLET, FILM COATED ORAL
Qty: 30 TABLET | Refills: 6 | Status: SHIPPED | OUTPATIENT
Start: 2025-03-03

## 2025-03-03 NOTE — TELEPHONE ENCOUNTER
Refill Encounter    PCP Visits: Recent Visits  Date Type Provider Dept   01/28/25 Office Visit Eduard Foster NP [facility] Internal Medicine   05/24/24 Office Visit Eduard Foster NP [facility] Internal Medicine   Showing recent visits within past 360 days and meeting all other requirements  Future Appointments  Date Type Provider Dept   03/24/25 Appointment Epifanio Hairston, NP [facility] Internal Medicine   Showing future appointments within next 720 days and meeting all other requirements      Last 3 Blood Pressure:   BP Readings from Last 3 Encounters:   01/28/25 126/82   09/17/24 129/79   08/06/24 110/78     Preferred Pharmacy:   [facility]/pharmacy #[id number] - [place], [place] - [address] [facility]  [address] [facility] [address]  Phone: [phone] Fax: [phone]    Requested RX:  Requested Prescriptions     Pending Prescriptions Disp Refills    JARDIANCE 10 mg tablet [Pharmacy Med Name: JARDIANCE 10 MG TABLET] 30 tablet 6     Sig: TAKE 1 TABLET BY MOUTH EVERY DAY      RX Route: Normal

## 2025-03-07 ENCOUNTER — PATIENT MESSAGE (OUTPATIENT)
Dept: INTERNAL MEDICINE | Facility: CLINIC | Age: 70
End: 2025-03-07
Payer: MEDICARE

## 2025-03-07 DIAGNOSIS — Z78.9 RUBELLA IMMUNE STATUS NOT KNOWN: Primary | ICD-10-CM

## 2025-03-07 RX ORDER — INSULIN GLARGINE 100 [IU]/ML
10 INJECTION, SOLUTION SUBCUTANEOUS NIGHTLY
Qty: 15 EACH | Refills: 3 | Status: SHIPPED | OUTPATIENT
Start: 2025-03-07 | End: 2026-03-07

## 2025-03-07 NOTE — TELEPHONE ENCOUNTER
Refill Encounter    PCP Visits: Recent Visits  Date Type Provider Dept   01/28/25 Office Visit Eduard Foster NP [facility] Internal Medicine   05/24/24 Office Visit Eduard Foster NP [facility] Internal Medicine   Showing recent visits within past 360 days and meeting all other requirements  Future Appointments  Date Type Provider Dept   03/24/25 Appointment Epifanio Hairston NP [facility] Internal Medicine   Showing future appointments within next 720 days and meeting all other requirements      Last 3 Blood Pressure:   BP Readings from Last 3 Encounters:   01/28/25 126/82   09/17/24 129/79   08/06/24 110/78     Preferred Pharmacy:   [facility]/pharmacy #[id number] - [facility] [address] [address]  Phone: [phone] Fax: [phone]    [facility] - [facility] [address] [facility].  [address] [facility] [address]  Phone: [phone] Fax: [phone]    [facility] [address] [place] [address] [address] [place] [address]  [address] [place] [address]  Phone: [phone] Fax: [phone]    Requested RX:  Requested Prescriptions     Pending Prescriptions Disp Refills    LANTUS SOLOSTAR U-100 INSULIN 100 unit/mL (3 mL) InPn pen [Pharmacy Med Name: LANTUS SOLOSTAR 100 UNIT/ML] 15 each 3     Sig: INJECT 10 UNITS INTO THE SKIN EVERY EVENING.      RX Route: Normal

## 2025-03-07 NOTE — TELEPHONE ENCOUNTER
No care due was identified.  Health [facility] Embedded Care Due Messages. Reference number: [id number].   3/07/2025 12:00:59 AM CST

## 2025-03-10 ENCOUNTER — OFFICE VISIT (OUTPATIENT)
Dept: INTERNAL MEDICINE | Facility: CLINIC | Age: 70
End: 2025-03-10
Payer: COMMERCIAL

## 2025-03-10 ENCOUNTER — LAB VISIT (OUTPATIENT)
Dept: LAB | Facility: OTHER | Age: 70
End: 2025-03-10
Attending: FAMILY MEDICINE
Payer: COMMERCIAL

## 2025-03-10 VITALS
WEIGHT: 199.94 LBS | BODY MASS INDEX: 32.13 KG/M2 | HEIGHT: 66 IN | SYSTOLIC BLOOD PRESSURE: 161 MMHG | OXYGEN SATURATION: 99 % | DIASTOLIC BLOOD PRESSURE: 96 MMHG | HEART RATE: 79 BPM

## 2025-03-10 DIAGNOSIS — I10 PRIMARY HYPERTENSION: ICD-10-CM

## 2025-03-10 DIAGNOSIS — I49.5 SSS (SICK SINUS SYNDROME): ICD-10-CM

## 2025-03-10 DIAGNOSIS — E21.3 HYPERPARATHYROIDISM, UNSPECIFIED: ICD-10-CM

## 2025-03-10 DIAGNOSIS — Z78.9 RUBELLA IMMUNE STATUS NOT KNOWN: ICD-10-CM

## 2025-03-10 DIAGNOSIS — Z23 NEED FOR PNEUMOCOCCAL VACCINATION: ICD-10-CM

## 2025-03-10 DIAGNOSIS — I50.42 CHRONIC COMBINED SYSTOLIC AND DIASTOLIC HEART FAILURE: Primary | ICD-10-CM

## 2025-03-10 PROCEDURE — 90471 IMMUNIZATION ADMIN: CPT | Mod: S$GLB,,, | Performed by: FAMILY MEDICINE

## 2025-03-10 PROCEDURE — 3288F FALL RISK ASSESSMENT DOCD: CPT | Mod: CPTII,S$GLB,, | Performed by: FAMILY MEDICINE

## 2025-03-10 PROCEDURE — 86765 RUBEOLA ANTIBODY: CPT | Performed by: FAMILY MEDICINE

## 2025-03-10 PROCEDURE — 99999 PR PBB SHADOW E&M-EST. PATIENT-LVL III: CPT | Mod: PBBFAC,,, | Performed by: FAMILY MEDICINE

## 2025-03-10 PROCEDURE — 1126F AMNT PAIN NOTED NONE PRSNT: CPT | Mod: CPTII,S$GLB,, | Performed by: FAMILY MEDICINE

## 2025-03-10 PROCEDURE — 4010F ACE/ARB THERAPY RXD/TAKEN: CPT | Mod: CPTII,S$GLB,, | Performed by: FAMILY MEDICINE

## 2025-03-10 PROCEDURE — 1101F PT FALLS ASSESS-DOCD LE1/YR: CPT | Mod: CPTII,S$GLB,, | Performed by: FAMILY MEDICINE

## 2025-03-10 PROCEDURE — 90677 PCV20 VACCINE IM: CPT | Mod: S$GLB,,, | Performed by: FAMILY MEDICINE

## 2025-03-10 PROCEDURE — 3008F BODY MASS INDEX DOCD: CPT | Mod: CPTII,S$GLB,, | Performed by: FAMILY MEDICINE

## 2025-03-10 PROCEDURE — 86762 RUBELLA ANTIBODY: CPT | Performed by: FAMILY MEDICINE

## 2025-03-10 PROCEDURE — 3080F DIAST BP >= 90 MM HG: CPT | Mod: CPTII,S$GLB,, | Performed by: FAMILY MEDICINE

## 2025-03-10 PROCEDURE — 3044F HG A1C LEVEL LT 7.0%: CPT | Mod: CPTII,S$GLB,, | Performed by: FAMILY MEDICINE

## 2025-03-10 PROCEDURE — 1159F MED LIST DOCD IN RCRD: CPT | Mod: CPTII,S$GLB,, | Performed by: FAMILY MEDICINE

## 2025-03-10 PROCEDURE — 36415 COLL VENOUS BLD VENIPUNCTURE: CPT | Performed by: FAMILY MEDICINE

## 2025-03-10 PROCEDURE — 99214 OFFICE O/P EST MOD 30 MIN: CPT | Mod: 25,S$GLB,, | Performed by: FAMILY MEDICINE

## 2025-03-10 PROCEDURE — 86735 MUMPS ANTIBODY: CPT | Performed by: FAMILY MEDICINE

## 2025-03-10 PROCEDURE — 3077F SYST BP >= 140 MM HG: CPT | Mod: CPTII,S$GLB,, | Performed by: FAMILY MEDICINE

## 2025-03-10 RX ORDER — AMLODIPINE BESYLATE 5 MG/1
5 TABLET ORAL DAILY
Qty: 30 TABLET | Refills: 3 | Status: SHIPPED | OUTPATIENT
Start: 2025-03-10 | End: 2026-03-10

## 2025-03-10 NOTE — PROGRESS NOTES
Subjective:       Patient ID: Ramesh Acosta is a 70 y.o. male.    Chief Complaint: Hypertension and Anxiety    Hypertension  This is a recurrent problem. The current episode started more than 1 year ago. The problem has been rapidly worsening since onset. The problem is controlled. Associated symptoms include anxiety, headaches, malaise/fatigue and peripheral edema. Pertinent negatives include no blurred vision, chest pain, neck pain, orthopnea, palpitations, PND, shortness of breath or sweats. There are no associated agents to hypertension. Risk factors for coronary artery disease include diabetes mellitus and obesity. Past treatments include beta blockers, diuretics and lifestyle changes. The current treatment provides significant improvement. There are no compliance problems.    Anxiety  Patient reports no chest pain, palpitations or shortness of breath.         History of Present Illness    CHIEF COMPLAINT:  Ramesh presents today for follow up on blood pressure    HYPERTENSION:  He reports recent blood pressure spikes after months of stability, with average readings of 145/100 in the last month. While he previously experienced feelings of dread during episodes of elevated blood pressure, these symptoms are not present with current elevated readings. He emphasizes medication compliance with Intrasteal, Metoprolol, and Spironolactone for blood pressure management.    MEDICAL HISTORY:  He has a history of heart disease, diabetes, and previous COVID infection.    DIABETES MANAGEMENT:  His A1c was 6.3 from labs completed at the end of January. He continues Lantus 10 units and Jardiance.    CURRENT MEDICATIONS:  He takes Rosuvastatin and reports rare use of Cialis.    SUBSTANCE USE:  He recently discontinued daily marijuana vape pen use after losing the device and denies experiencing anxiety since discontinuation.      ROS:  General: -fever, -chills, -fatigue, -weight gain, -weight loss  Eyes: -vision  "changes, -redness, -discharge  ENT: -ear pain, -nasal congestion, -sore throat  Cardiovascular: -chest pain, -palpitations, -lower extremity edema  Respiratory: -cough, -shortness of breath  Gastrointestinal: -abdominal pain, -nausea, -vomiting, -diarrhea, -constipation, -blood in stool  Genitourinary: -dysuria, -hematuria, -frequency  Musculoskeletal: -joint pain, -muscle pain  Skin: -rash, -lesion  Neurological: -headache, -dizziness, -numbness, -tingling  Psychiatric: -anxiety, -depression, -sleep difficulty           All of your core healthy metrics are met.      Social History     Social History Narrative    Not on file       Family History   Problem Relation Name Age of Onset    No Known Problems Father      Breast cancer Mother       Current Medications[1]    Review of Systems   Constitutional:  Positive for malaise/fatigue.   Eyes:  Negative for blurred vision.   Respiratory:  Negative for shortness of breath.    Cardiovascular:  Negative for chest pain, palpitations, orthopnea and PND.   Musculoskeletal:  Negative for neck pain.   Neurological:  Positive for headaches.       Objective:   BP (!) 161/96 (BP Location: Left arm, Patient Position: Sitting)   Pulse 79   Ht 5' 6" (1.676 m)   Wt 90.7 kg (199 lb 15.3 oz)   SpO2 99%   BMI 32.27 kg/m²      Physical Exam  Vitals reviewed.   Constitutional:       Appearance: He is well-developed.   HENT:      Head: Normocephalic and atraumatic.   Eyes:      Conjunctiva/sclera: Conjunctivae normal.   Cardiovascular:      Rate and Rhythm: Normal rate.   Pulmonary:      Effort: Pulmonary effort is normal. No respiratory distress.   Skin:     General: Skin is warm and dry.      Findings: No rash.   Neurological:      Mental Status: He is alert and oriented to person, place, and time.      Coordination: Coordination normal.   Psychiatric:         Behavior: Behavior normal.         Physical Exam              @resultssec@  Assessment & Plan   Assessment & Plan  "   IMPRESSION:  - Assessed patient's blood pressure fluctuations, noting recent spike with associated symptoms of dread  - Considered potential correlation between cessation of daily marijuana vaping and blood pressure increase, though patient denies increased anxiety  - Reviewed current medication regimen, including intrasteal, metoprolol, and spironolactone for blood pressure management  - Will add amlodipine 5mg to complement existing antihypertensive medications due to consistently elevated readings over the past month    PLAN SUMMARY:   Continue Lantus insulin (10 units) and Jardiance for diabetes management   Continue rosuvastatin for hyperlipidemia management   Continue intrasteal medication for heart condition   Prescribed amlodipine 5mg daily for blood pressure management   Schedule eye exam after May for annual retinal screening   Send blood pressure readings through [facility] in a few weeks   Follow-up to review blood pressure readings    CHRONIC COMBINED SYSTOLIC AND DIASTOLIC HEART FAILURE:   Ramesh's blood pressure has been consistently fine for months but recently spiked up, with an average of 145/100 in the last month.   Ramesh reports feeling dread-like symptoms with high blood pressure, but feels fine today despite elevated readings.   Recent labs from end of January looked good with A1c at 6.3.    HYPERTENSION:   Prescribed amlodipine 5mg daily, to be taken consistently either in the morning or at night.   Instructed the patient to send blood pressure readings through [facility] in a few weeks and scheduled follow-up to review these readings.   Evaluated current blood pressure medications: intrasteal, metoprolol, and spironolactone.   Noted correlation between blood pressure spikes and patient losing vape pen for marijuana.   Ramesh previously used marijuana daily via vape pen but denies feeling more anxious since discontinuing.   Discussed potential connection between marijuana use and blood  pressure, noting it can affect blood pressure in both directions.    HEART DISEASE:   Acknowledged patient's heart disease as a risk factor for COVID-19.   Continued intrasteal medication to manage heart condition.    DIABETES:   Continued Lantus insulin (10 units) and Jardiance (oral hypoglycemic) for diabetes management.   Instructed patient to schedule eye exam after May for annual retinal screening.    HYPERLIPIDEMIA:   Continued rosuvastatin for hyperlipidemia management.    COVID-19:   Noted patient's previous infection diagnosed during a hospital visit.   Confirmed patient has received multiple vaccinations due to heart disease and diabetes risk factors.         Problem List Items Addressed This Visit          Cardiac/Vascular    Primary hypertension    Chronic combined systolic and diastolic heart failure - Primary    SSS (sick sinus syndrome)    Current Assessment & Plan   Doing well. Has ICD            Endocrine    Hyperparathyroidism, unspecified    Current Assessment & Plan   Calcium stable.           Other Visit Diagnoses         Need for pneumococcal vaccination        Relevant Medications    pneumoc 20-regan conj-dip cr(PF) (PREVNAR-20 (PF)) injection Syrg 0.5 mL (Completed)              Immunizations Administered on Date of Encounter - 3/10/2025       Name Date Dose VIS Date Route Exp Date    Pneumococcal Conjugate - 20 Valent 3/10/2025 10:29 AM 0.5 mL 5/12/2023 Intramuscular 5/31/2026    Site: Left deltoid     Given By: Bonita Harp LPN     : [employer]     Lot: [id number]              No follow-ups on file.    This note was generated with the assistance of ambient listening technology. Verbal consent was obtained by the patient and accompanying visitor(s) for the recording of patient appointment to facilitate this note. I attest to having reviewed and edited the generated note for accuracy, though some syntax or spelling errors may persist. Please contact the author of this note for any  "clarification.      Disclaimer:  This note may have been prepared using voice recognition software, it may have not been extensively proofed, as such there could be errors within the text such as sound alike errors.         [1]   Current Outpatient Medications:     colchicine, gout, (COLCRYS) 0.6 mg tablet, Take 0.6 mg by mouth 2 (two) times daily as needed., Disp: , Rfl:     ENTRESTO  mg per tablet, Take 1 tablet by mouth twice daily, Disp: 60 tablet, Rfl: 3    fluticasone propionate (FLONASE) 50 mcg/actuation nasal spray, 1 spray by Each Nostril route once daily., Disp: , Rfl:     hydrOXYzine HCL (ATARAX) 25 MG tablet, TAKE 1 TABLET BY MOUTH EVERY DAY AT NIGHT, Disp: 20 tablet, Rfl: 0    JARDIANCE 10 mg tablet, TAKE 1 TABLET BY MOUTH EVERY DAY, Disp: 30 tablet, Rfl: 6    LANTUS SOLOSTAR U-100 INSULIN 100 unit/mL (3 mL) InPn pen, INJECT 10 UNITS INTO THE SKIN EVERY EVENING., Disp: 15 each, Rfl: 3    levocetirizine (XYZAL) 5 MG tablet, Take 5 mg by mouth every evening., Disp: , Rfl:     metoprolol succinate (TOPROL-XL) 100 MG 24 hr tablet, Take 1 tablet (100 mg total) by mouth every evening., Disp: 30 tablet, Rfl: 11    pen needle, diabetic (BD ULTRA-FINE SHORT PEN NEEDLE) 31 gauge x 5/16" Ndle, 1 each by Misc.(Non-Drug; Combo Route) route nightly., Disp: 100 each, Rfl: 3    rosuvastatin (CRESTOR) 5 MG tablet, TAKE 1 TABLET BY MOUTH EVERY DAY, Disp: 90 tablet, Rfl: 2    spironolactone (ALDACTONE) 25 MG tablet, TAKE 1 TABLET BY MOUTH EVERY DAY, Disp: 90 tablet, Rfl: 3    tadalafiL (CIALIS) 20 MG Tab, Take 1 tablet (20 mg total) by mouth once daily., Disp: 30 tablet, Rfl: 11    amLODIPine (NORVASC) 5 MG tablet, Take 1 tablet (5 mg total) by mouth once daily., Disp: 30 tablet, Rfl: 3  No current facility-administered medications for this visit.    "

## 2025-03-11 ENCOUNTER — CLINICAL SUPPORT (OUTPATIENT)
Dept: CARDIOLOGY | Facility: HOSPITAL | Age: 70
End: 2025-03-11
Payer: MEDICARE

## 2025-03-11 ENCOUNTER — CLINICAL SUPPORT (OUTPATIENT)
Dept: CARDIOLOGY | Facility: HOSPITAL | Age: 70
End: 2025-03-11
Attending: INTERNAL MEDICINE
Payer: MEDICARE

## 2025-03-11 DIAGNOSIS — Z95.810 PRESENCE OF AUTOMATIC (IMPLANTABLE) CARDIAC DEFIBRILLATOR: ICD-10-CM

## 2025-03-11 DIAGNOSIS — I50.42 CHRONIC COMBINED SYSTOLIC (CONGESTIVE) AND DIASTOLIC (CONGESTIVE) HEART FAILURE: ICD-10-CM

## 2025-03-11 DIAGNOSIS — I42.9 CARDIOMYOPATHY, UNSPECIFIED: ICD-10-CM

## 2025-03-11 LAB
RUBV IGG SER-ACNC: <5 IU/ML
RUBV IGG SER-IMP: ABNORMAL

## 2025-03-12 LAB
MUMPS IGG INTERPRETATION: NEGATIVE
MUMPS IGG SCREEN: <5 AU/ML
RUBEOLA IGG ANTIBODY: 20.7 AU/ML
RUBEOLA INTERPRETATION: POSITIVE

## 2025-03-13 ENCOUNTER — PATIENT MESSAGE (OUTPATIENT)
Dept: INTERNAL MEDICINE | Facility: CLINIC | Age: 70
End: 2025-03-13
Payer: MEDICARE

## 2025-03-24 ENCOUNTER — OFFICE VISIT (OUTPATIENT)
Dept: INTERNAL MEDICINE | Facility: CLINIC | Age: 70
End: 2025-03-24
Payer: COMMERCIAL

## 2025-03-24 VITALS
OXYGEN SATURATION: 99 % | HEIGHT: 66 IN | DIASTOLIC BLOOD PRESSURE: 84 MMHG | SYSTOLIC BLOOD PRESSURE: 117 MMHG | WEIGHT: 197.44 LBS | HEART RATE: 71 BPM | BODY MASS INDEX: 31.73 KG/M2

## 2025-03-24 DIAGNOSIS — Z00.00 ENCOUNTER FOR MEDICARE ANNUAL WELLNESS EXAM: ICD-10-CM

## 2025-03-24 DIAGNOSIS — I50.42 CHRONIC COMBINED SYSTOLIC AND DIASTOLIC HEART FAILURE: ICD-10-CM

## 2025-03-24 DIAGNOSIS — E11.8 DM TYPE 2 CAUSING COMPLICATION: ICD-10-CM

## 2025-03-24 DIAGNOSIS — E21.3 HYPERPARATHYROIDISM, UNSPECIFIED: ICD-10-CM

## 2025-03-24 DIAGNOSIS — Z95.810 ICD (IMPLANTABLE CARDIOVERTER-DEFIBRILLATOR) IN PLACE: ICD-10-CM

## 2025-03-24 DIAGNOSIS — I42.8 NONISCHEMIC CARDIOMYOPATHY: ICD-10-CM

## 2025-03-24 DIAGNOSIS — I10 PRIMARY HYPERTENSION: ICD-10-CM

## 2025-03-24 DIAGNOSIS — N52.01 ERECTILE DYSFUNCTION DUE TO ARTERIAL INSUFFICIENCY: Primary | ICD-10-CM

## 2025-03-24 DIAGNOSIS — I49.5 SSS (SICK SINUS SYNDROME): ICD-10-CM

## 2025-03-24 PROCEDURE — 4010F ACE/ARB THERAPY RXD/TAKEN: CPT | Mod: CPTII,S$GLB,, | Performed by: NURSE PRACTITIONER

## 2025-03-24 PROCEDURE — 3044F HG A1C LEVEL LT 7.0%: CPT | Mod: CPTII,S$GLB,, | Performed by: NURSE PRACTITIONER

## 2025-03-24 PROCEDURE — 1126F AMNT PAIN NOTED NONE PRSNT: CPT | Mod: CPTII,S$GLB,, | Performed by: NURSE PRACTITIONER

## 2025-03-24 PROCEDURE — 3074F SYST BP LT 130 MM HG: CPT | Mod: CPTII,S$GLB,, | Performed by: NURSE PRACTITIONER

## 2025-03-24 PROCEDURE — 3288F FALL RISK ASSESSMENT DOCD: CPT | Mod: CPTII,S$GLB,, | Performed by: NURSE PRACTITIONER

## 2025-03-24 PROCEDURE — 1158F ADVNC CARE PLAN TLK DOCD: CPT | Mod: CPTII,S$GLB,, | Performed by: NURSE PRACTITIONER

## 2025-03-24 PROCEDURE — 1160F RVW MEDS BY RX/DR IN RCRD: CPT | Mod: CPTII,S$GLB,, | Performed by: NURSE PRACTITIONER

## 2025-03-24 PROCEDURE — G0439 PPPS, SUBSEQ VISIT: HCPCS | Mod: S$GLB,,, | Performed by: NURSE PRACTITIONER

## 2025-03-24 PROCEDURE — 1170F FXNL STATUS ASSESSED: CPT | Mod: CPTII,S$GLB,, | Performed by: NURSE PRACTITIONER

## 2025-03-24 PROCEDURE — 1159F MED LIST DOCD IN RCRD: CPT | Mod: CPTII,S$GLB,, | Performed by: NURSE PRACTITIONER

## 2025-03-24 PROCEDURE — 1101F PT FALLS ASSESS-DOCD LE1/YR: CPT | Mod: CPTII,S$GLB,, | Performed by: NURSE PRACTITIONER

## 2025-03-24 PROCEDURE — 99999 PR PBB SHADOW E&M-EST. PATIENT-LVL V: CPT | Mod: PBBFAC,,, | Performed by: NURSE PRACTITIONER

## 2025-03-24 PROCEDURE — 3079F DIAST BP 80-89 MM HG: CPT | Mod: CPTII,S$GLB,, | Performed by: NURSE PRACTITIONER

## 2025-03-24 NOTE — PROGRESS NOTES
Writer left v-mail advising patient that this med is managed by Cardiology. Advised he should contact the appropriate doctor for refill Narendra Painter MD. Office number left for patient questions.     "Ramesh Acosta presented for an initial Medicare AWV today. The following components were reviewed and updated:    Medical history  Family History  Social history  Allergies and Current Medications  Health Risk Assessment  Health Maintenance  Care Team    **See Completed Assessments for Annual Wellness visit with in the encounter summary    The following assessments were completed:  Depression Screening  Cognitive function Screening  Timed Get Up Test  Whisper Test      Opioid documentation:      Patient does not have a current opioid prescription.            Vitals:    03/24/25 1421   BP: 117/84   BP Location: Left arm   Patient Position: Sitting   Pulse: 71   SpO2: 99%   Weight: 89.5 kg (197 lb 6.8 oz)   Height: 5' 6" (1.676 m)     Body mass index is 31.86 kg/m².       Physical Exam  Constitutional:       Appearance: Normal appearance.   Cardiovascular:      Rate and Rhythm: Normal rate and regular rhythm.   Pulmonary:      Effort: Pulmonary effort is normal.      Breath sounds: Normal breath sounds.   Musculoskeletal:         General: Normal range of motion.   Neurological:      Mental Status: He is alert and oriented to person, place, and time.   Psychiatric:         Mood and Affect: Mood normal.           Diagnoses and health risks identified today and associated recommendations/orders:  1. Encounter for Medicare annual wellness exam  Annual Health Risk Assessment (HRA) visit today.  Counseling and referral of health maintenance and preventative health measures performed.  Patient given annual wellness paperwork to take home.  Encouraged to return in 1 year for subsequent HRA visit.   - Referral to Enhanced Annual Wellness Visit (eAWV) W+1    2. Erectile dysfunction due to arterial insufficiency  Chronic. Stable. Patient is on Cialis. Continue current treatment plan as previously prescribed by PCP.    3. ICD (implantable cardioverter-defibrillator) in place  Chronic. Stable. Continue current treatment plan " as previously prescribed by PCP.    4. Chronic combined systolic and diastolic heart failure  Chronic. Stable. Continue current treatment plan as previously prescribed by PCP.    5. Nonischemic cardiomyopathy  Chronic. Stable. Continue current treatment plan as previously prescribed by PCP.    6. Primary hypertension  Chronic. Stable. Controlled. Encouraged to increase exercise as tolerated (moderate-intensity aerobic activity and muscle-strengthening activities) improve diet to heart healthy, low sodium diet.  Continue current treatment plan as previously prescribed by PCP.    7. SSS (sick sinus syndrome)  Chronic. Stable. Continue current treatment plan as previously prescribed by PCP.    8. Hyperparathyroidism, unspecified  Chronic. Stable. Continue current treatment plan as previously prescribed by PCP.    9. DM type 2 causing complication  Chronic. Stable. Controlled. Last Hgb A1c=6.2 from 1/28/25. Continue current treatment plan as previously prescribed by PCP.      Provided [facility] with a 5-10 year written screening schedule and personal prevention plan. Recommendations were developed using the USPSTF age appropriate recommendations. Education, counseling, and referrals were provided as needed.  After Visit Summary printed and given to patient which includes a list of additional screenings\tests needed.    Follow up in about 1 year (around 3/24/2026).      Epifanio Hairston NP

## 2025-03-24 NOTE — PATIENT INSTRUCTIONS
Counseling and Referral of Other Preventative  (Italic type indicates deductible and co-insurance are waived)    Patient Name: Ramesh Acosta  Today's Date: 3/24/2025    Health Maintenance       Date Due Completion Date    Foot Exam Never done ---    TETANUS VACCINE 02/15/2021 2/15/2011    Shingles Vaccine (2 of 2) 09/12/2023 7/18/2023    Diabetic Eye Exam 05/06/2025 5/6/2024    Diabetes Urine Screening 05/06/2025 5/6/2024    Lipid Panel 05/27/2025 5/27/2024    Hemoglobin A1c 07/28/2025 1/28/2025    Colorectal Cancer Screening 01/27/2026 1/27/2023    PROSTATE-SPECIFIC ANTIGEN 01/28/2026 1/28/2025    Low Dose Statin 03/10/2026 3/10/2025        No orders of the defined types were placed in this encounter.      The following information is provided to all patients.  This information is to help you find resources for any of the problems found today that may be affecting your health:                  Living healthy guide: www.[facility].[place].gov      Understanding Diabetes: www.diabetes.org      Eating healthy: www.cdc.gov/healthyweight      CDC home safety checklist: www.cdc.gov/steadi/patient.html      Agency on Aging: www.goea.[place].gov      Alcoholics anonymous (AA): www.aa.org      Physical Activity: www.keyanna.nih.gov/ck9fjih      Tobacco use: www.quitwithusla.org

## 2025-03-28 NOTE — TELEPHONE ENCOUNTER
No care due was identified.  [facility] Embedded Care Due Messages. Reference number: [id number].   3/28/2025 4:13:30 PM CDT

## 2025-03-29 RX ORDER — PEN NEEDLE, DIABETIC 31 GX5/16"
NEEDLE, DISPOSABLE MISCELLANEOUS
Qty: 100 EACH | Refills: 3 | Status: SHIPPED | OUTPATIENT
Start: 2025-03-29

## 2025-03-29 NOTE — TELEPHONE ENCOUNTER
Refill Decision Note   Ramesh Acosta  is requesting a refill authorization.  Brief Assessment and Rationale for Refill:  Approve     Medication Therapy Plan:         Comments:     Note composed:2:08 PM 03/29/2025

## 2025-04-04 DIAGNOSIS — F41.9 ANXIETY: ICD-10-CM

## 2025-04-04 RX ORDER — HYDROXYZINE HYDROCHLORIDE 25 MG/1
25 TABLET, FILM COATED ORAL NIGHTLY
Qty: 20 TABLET | Refills: 0 | Status: SHIPPED | OUTPATIENT
Start: 2025-04-04

## 2025-04-04 NOTE — TELEPHONE ENCOUNTER
Refill Encounter    PCP Visits: Recent Visits  Date Type Provider Dept   03/24/25 Office Visit Epifanio Hairston, NP [facility] Internal Medicine   03/10/25 Office Visit Baldemar Savage DO [facility] Medicine   01/28/25 Office Visit Eduard Foster, NP [facility]   05/24/24 Office Visit Eduard Foster, NP [facility]   Showing recent visits within past 360 days and meeting all other requirements  Future Appointments  No visits were found meeting these conditions.  Showing future appointments within next 720 days and meeting all other requirements      Last 3 Blood Pressure:   BP Readings from Last 3 Encounters:   03/24/25 117/84   03/10/25 (!) 161/96   01/28/25 126/82     Preferred Pharmacy:   [facility]/pharmacy #[id number] [place], [place] - [address] [place] [address]  Phone: [phone] Fax: [phone]    Requested RX:  Requested Prescriptions     Pending Prescriptions Disp Refills    hydrOXYzine HCL (ATARAX) 25 MG tablet [Pharmacy Med Name: HYDROXYZINE HCL 25 MG TABLET] 20 tablet 0     Sig: TAKE 1 TABLET BY MOUTH EVERY DAY AT NIGHT      RX Route: Normal

## 2025-05-18 DIAGNOSIS — F41.9 ANXIETY: ICD-10-CM

## 2025-05-18 DIAGNOSIS — I42.8 NONISCHEMIC CARDIOMYOPATHY: ICD-10-CM

## 2025-05-18 DIAGNOSIS — I50.42 CHRONIC COMBINED SYSTOLIC AND DIASTOLIC HEART FAILURE: ICD-10-CM

## 2025-05-19 RX ORDER — HYDROXYZINE HYDROCHLORIDE 25 MG/1
25 TABLET, FILM COATED ORAL NIGHTLY
Qty: 20 TABLET | Refills: 0 | Status: SHIPPED | OUTPATIENT
Start: 2025-05-19

## 2025-05-19 RX ORDER — METOPROLOL SUCCINATE 100 MG/1
100 TABLET, EXTENDED RELEASE ORAL NIGHTLY
Qty: 90 TABLET | Refills: 3 | OUTPATIENT
Start: 2025-05-19

## 2025-05-19 NOTE — TELEPHONE ENCOUNTER
Refill Encounter  Allergies: Confirmed    PCP Visits:   Recent Visits  Date Type Provider Dept   03/10/25 Office Visit Hussein, Baldemar HINES DO [facility] Internal Medicine   Showing recent visits within past 360 days and meeting all other requirements  Future Appointments  No visits were found meeting these conditions.  Showing future appointments within next 720 days and meeting all other requirements     Last 3 Blood Pressure:   BP Readings from Last 3 Encounters:   03/24/25 117/84   03/10/25 (!) 161/96   01/28/25 126/82     Preferred Pharmacy:   [facility]/pharmacy #[id number] - [address] [address]  Phone: [phone] Fax: [phone]    Requested RX:  Requested Prescriptions     Pending Prescriptions Disp Refills    hydrOXYzine HCL (ATARAX) 25 MG tablet [Pharmacy Med Name: HYDROXYZINE HCL 25 MG TABLET] 20 tablet 0     Sig: TAKE 1 TABLET BY MOUTH EVERY DAY AT NIGHT      RX Route: Normal

## 2025-05-20 ENCOUNTER — PATIENT MESSAGE (OUTPATIENT)
Dept: TRANSPLANT | Facility: CLINIC | Age: 70
End: 2025-05-20
Payer: COMMERCIAL

## 2025-05-20 DIAGNOSIS — I50.42 CHRONIC COMBINED SYSTOLIC AND DIASTOLIC HEART FAILURE: ICD-10-CM

## 2025-05-20 DIAGNOSIS — I42.8 NONISCHEMIC CARDIOMYOPATHY: ICD-10-CM

## 2025-05-20 RX ORDER — METOPROLOL SUCCINATE 100 MG/1
100 TABLET, EXTENDED RELEASE ORAL NIGHTLY
Qty: 90 TABLET | Refills: 0 | Status: SHIPPED | OUTPATIENT
Start: 2025-05-20

## 2025-05-21 DIAGNOSIS — E11.9 TYPE 2 DIABETES MELLITUS WITHOUT COMPLICATION: ICD-10-CM

## 2025-05-21 DIAGNOSIS — E11.9 TYPE 2 DIABETES MELLITUS WITHOUT COMPLICATION, UNSPECIFIED WHETHER LONG TERM INSULIN USE: ICD-10-CM

## 2025-06-02 RX ORDER — SACUBITRIL AND VALSARTAN 97; 103 MG/1; MG/1
1 TABLET, FILM COATED ORAL 2 TIMES DAILY
Qty: 60 TABLET | Refills: 1 | Status: SHIPPED | OUTPATIENT
Start: 2025-06-02

## 2025-06-04 DIAGNOSIS — E11.9 TYPE 2 DIABETES MELLITUS WITHOUT COMPLICATION: ICD-10-CM

## 2025-06-07 NOTE — TELEPHONE ENCOUNTER
Care Due:                  Date            Visit Type   Department     Provider  --------------------------------------------------------------------------------                                MYCHART                              FOLLOWUP/OF  [facility] INTERNAL  Last Visit: 03-      FICE VISIT   MEDICINE       Baldemar Savage  Next Visit: None Scheduled  None         None Found                                                            Last  Test          Frequency    Reason                     Performed    Due Date  --------------------------------------------------------------------------------    HBA1C.......  6 months...  LANTUS...................  01- 07-    [facility] Embedded Care Due Messages. Reference number: [id number].   6/07/2025 3:31:33 PM CDT

## 2025-06-08 NOTE — TELEPHONE ENCOUNTER
Refill Routing Note   Medication(s) are not appropriate for processing by [facility] for the following reason(s):        New or recently adjusted medication    [facility] action(s):  Defer   Requires labs : Yes             Appointments  past 12m or future 3m with PCP    Date Provider   Last Visit   3/10/2025 Baldemar Savage, DO   Next Visit   Visit date not found Baldemar Savage, DO   ED visits in past 90 days: 0        Note composed:10:42 AM 06/08/2025

## 2025-06-09 RX ORDER — AMLODIPINE BESYLATE 5 MG/1
5 TABLET ORAL
Qty: 90 TABLET | Refills: 3 | Status: SHIPPED | OUTPATIENT
Start: 2025-06-09

## 2025-06-10 ENCOUNTER — CLINICAL SUPPORT (OUTPATIENT)
Dept: CARDIOLOGY | Facility: HOSPITAL | Age: 70
End: 2025-06-10
Payer: COMMERCIAL

## 2025-06-10 ENCOUNTER — CLINICAL SUPPORT (OUTPATIENT)
Dept: CARDIOLOGY | Facility: HOSPITAL | Age: 70
End: 2025-06-10
Attending: INTERNAL MEDICINE
Payer: COMMERCIAL

## 2025-06-10 DIAGNOSIS — I50.42 CHRONIC COMBINED SYSTOLIC (CONGESTIVE) AND DIASTOLIC (CONGESTIVE) HEART FAILURE: ICD-10-CM

## 2025-06-10 DIAGNOSIS — Z95.810 PRESENCE OF AUTOMATIC (IMPLANTABLE) CARDIAC DEFIBRILLATOR: ICD-10-CM

## 2025-06-10 DIAGNOSIS — I42.9 CARDIOMYOPATHY, UNSPECIFIED: ICD-10-CM

## 2025-06-10 PROCEDURE — 93295 DEV INTERROG REMOTE 1/2/MLT: CPT | Mod: ,,, | Performed by: INTERNAL MEDICINE

## 2025-06-10 PROCEDURE — 93296 REM INTERROG EVL PM/IDS: CPT | Performed by: INTERNAL MEDICINE

## 2025-06-18 LAB
OHS CV AF BURDEN PERCENT: < 1
OHS CV DC REMOTE DEVICE TYPE: NORMAL
OHS CV RV PACING PERCENT: 0.26 %

## 2025-06-29 DIAGNOSIS — F41.9 ANXIETY: ICD-10-CM

## 2025-06-30 RX ORDER — HYDROXYZINE HYDROCHLORIDE 25 MG/1
25 TABLET, FILM COATED ORAL NIGHTLY
Qty: 20 TABLET | Refills: 0 | Status: SHIPPED | OUTPATIENT
Start: 2025-06-30

## 2025-06-30 NOTE — TELEPHONE ENCOUNTER
Refill Encounter    PCP Visits: Recent Visits  Date Type Provider Dept   03/24/25 Office Visit Epifanio Hairston, NP [facility] Internal Medicine   03/10/25 Office Visit Baldemar Savage DO [facility] Internal Medicine   01/28/25 Office Visit Eduard Foster, NP [facility] Internal Medicine   Showing recent visits within past 360 days and meeting all other requirements  Future Appointments  No visits were found meeting these conditions.  Showing future appointments within next 720 days and meeting all other requirements      Last 3 Blood Pressure:   BP Readings from Last 3 Encounters:   03/24/25 117/84   03/10/25 (!) 161/96   01/28/25 126/82     Preferred Pharmacy:   [facility]/pharmacy #[id number] [place], [place] - [address] [facility]  [address] [place] [id number]  Phone: [phone] Fax: [phone]      Requested RX:  Requested Prescriptions     Pending Prescriptions Disp Refills    hydrOXYzine HCL (ATARAX) 25 MG tablet [Pharmacy Med Name: HYDROXYZINE HCL 25 MG TABLET] 20 tablet 0     Sig: TAKE 1 TABLET BY MOUTH EVERY DAY AT NIGHT      RX Route: Normal

## 2025-08-13 DIAGNOSIS — E11.9 TYPE 2 DIABETES MELLITUS WITHOUT COMPLICATION: ICD-10-CM
